# Patient Record
Sex: FEMALE | Race: BLACK OR AFRICAN AMERICAN | NOT HISPANIC OR LATINO | Employment: UNEMPLOYED | URBAN - METROPOLITAN AREA
[De-identification: names, ages, dates, MRNs, and addresses within clinical notes are randomized per-mention and may not be internally consistent; named-entity substitution may affect disease eponyms.]

---

## 2023-03-24 PROBLEM — K52.9 INFLAMMATORY BOWEL DISEASE: Status: ACTIVE | Noted: 2023-03-24

## 2023-03-24 PROBLEM — K80.00 CALCULUS OF GALLBLADDER WITH ACUTE CHOLECYSTITIS WITHOUT OBSTRUCTION: Status: ACTIVE | Noted: 2023-03-24

## 2023-03-24 PROBLEM — N92.6 MISSED PERIOD: Status: ACTIVE | Noted: 2023-03-24

## 2023-03-24 PROBLEM — R73.03 PREDIABETES: Status: ACTIVE | Noted: 2023-03-24

## 2023-03-24 PROBLEM — N89.8 VAGINAL DISCHARGE: Status: ACTIVE | Noted: 2023-03-24

## 2023-03-24 PROBLEM — R14.3 FLATUS: Status: ACTIVE | Noted: 2023-03-24

## 2023-03-24 PROBLEM — L70.9 ACNE: Status: ACTIVE | Noted: 2023-03-24

## 2023-03-24 PROBLEM — B37.31 VAGINAL YEAST INFECTION: Status: ACTIVE | Noted: 2023-03-24

## 2023-03-24 PROBLEM — K21.9 GERD (GASTROESOPHAGEAL REFLUX DISEASE): Status: ACTIVE | Noted: 2023-03-24

## 2023-03-24 PROBLEM — R19.7 DIARRHEA: Status: ACTIVE | Noted: 2023-03-24

## 2023-03-24 RX ORDER — DIAPER,BRIEF,INFANT-TODD,DISP
EACH MISCELLANEOUS 2 TIMES DAILY PRN
COMMUNITY
End: 2024-01-02 | Stop reason: WASHOUT

## 2023-03-24 RX ORDER — DICYCLOMINE HYDROCHLORIDE 10 MG/1
10 CAPSULE ORAL 3 TIMES DAILY PRN
COMMUNITY
Start: 2021-12-02 | End: 2024-04-09 | Stop reason: WASHOUT

## 2023-03-24 RX ORDER — METFORMIN HYDROCHLORIDE 500 MG/1
500 TABLET ORAL 2 TIMES DAILY
COMMUNITY
End: 2024-04-09 | Stop reason: WASHOUT

## 2023-03-24 RX ORDER — NAPROXEN 500 MG/1
1 TABLET ORAL AS NEEDED
COMMUNITY
Start: 2021-04-12

## 2023-03-24 RX ORDER — MESALAMINE 1.2 G/1
4 TABLET, DELAYED RELEASE ORAL
COMMUNITY
Start: 2021-12-02 | End: 2023-12-05 | Stop reason: ALTCHOICE

## 2023-03-24 RX ORDER — PAROXETINE HYDROCHLORIDE 40 MG/1
1 TABLET, FILM COATED ORAL DAILY
COMMUNITY
Start: 2021-12-02 | End: 2023-10-09 | Stop reason: WASHOUT

## 2023-03-24 RX ORDER — OMEPRAZOLE 20 MG/1
1 CAPSULE, DELAYED RELEASE ORAL DAILY
COMMUNITY
Start: 2021-12-14 | End: 2024-01-02 | Stop reason: ALTCHOICE

## 2023-03-24 RX ORDER — QUETIAPINE FUMARATE 50 MG/1
1 TABLET, FILM COATED ORAL NIGHTLY
COMMUNITY
Start: 2021-12-02 | End: 2023-11-10 | Stop reason: SDUPTHER

## 2023-03-24 RX ORDER — CYCLOBENZAPRINE HCL 10 MG
1 TABLET ORAL 2 TIMES DAILY
COMMUNITY
Start: 2021-04-12 | End: 2023-10-09 | Stop reason: SDUPTHER

## 2023-03-24 RX ORDER — SIMETHICONE 80 MG
TABLET,CHEWABLE ORAL
COMMUNITY
Start: 2021-12-14 | End: 2024-01-02 | Stop reason: SDUPTHER

## 2023-08-11 ENCOUNTER — OFFICE VISIT (OUTPATIENT)
Dept: PRIMARY CARE | Facility: CLINIC | Age: 48
End: 2023-08-11
Payer: COMMERCIAL

## 2023-08-11 VITALS
RESPIRATION RATE: 18 BRPM | TEMPERATURE: 97.5 F | OXYGEN SATURATION: 99 % | DIASTOLIC BLOOD PRESSURE: 82 MMHG | HEART RATE: 88 BPM | BODY MASS INDEX: 35.57 KG/M2 | HEIGHT: 62 IN | SYSTOLIC BLOOD PRESSURE: 115 MMHG | WEIGHT: 193.3 LBS

## 2023-08-11 DIAGNOSIS — E66.9 OBESITY (BMI 30-39.9): Primary | ICD-10-CM

## 2023-08-11 PROCEDURE — 99213 OFFICE O/P EST LOW 20 MIN: CPT | Performed by: STUDENT IN AN ORGANIZED HEALTH CARE EDUCATION/TRAINING PROGRAM

## 2023-08-11 ASSESSMENT — PAIN SCALES - GENERAL: PAINLEVEL: 8

## 2023-08-11 NOTE — PROGRESS NOTES
"Subjective   Patient ID: Marquita To is a 48 y.o. female who presents for Establish Care (Nutrition), Back Pain, and Ankle Pain.    HPI   48 years old female with major complaint today of inability to lose weight.  Patient stated that even though she follows strict diet and exercise daily she cannot lose any weight which is frustrating to her.  Patient is adding that additional weight is leading to back pain and ankle pain with activity and exercise which making losing weight even more harder.  Concern of prediabetes and she was started on 500 mg of metformin however declined any plan to increase metformin dosage as it leads to GI discomfort.  Patient would like to be received to bariatric surgery.    Review of Systems  Negative unless stated in HPI  Objective   Blood Pressure 115/82 (BP Location: Right arm, Patient Position: Sitting, BP Cuff Size: Adult)   Pulse 88   Temperature 36.4 °C (97.5 °F) (Temporal)   Respiration 18   Height 1.575 m (5' 2\")   Weight 87.7 kg (193 lb 4.8 oz)   Oxygen Saturation 99%   Body Mass Index 35.36 kg/m²     Physical Exam  Constitutional: Well developed, awake, alert, oriented x3  Head and Face: NCAT  Eyes: Normal external exam, EOMI  ENT: Normal external inspection of ears and nose. Oropharynx normal.  Cardiovascular: RRR, S1/S2, no murmurs, rubs, or gallops, radial pulses +2, no edema of extremities  Pulmonary: CTAB, no respiratory distress.  Abdomen: +BS, soft, non-tender, nondistended, no guarding or rebound, no masses noted  Neuro: A&O x3, CN II-XII grossly intact  MSK: No joint swelling, normal movements of all extremities. Range of motion- normal.  Skin- No lesions, contusions, or erythema.  Psychiatric: Judgment intact. Appropriate mood and behavior    Assessment/Plan   48 years old female who is presenting to the clinic to get assistance in weight loss.  By chart review her weight have been almost the same over the past 18 months at least occasional 5 for 10 pounds " Brow lift up and down.  We will check patient A1c and TSH to look for untreated thyroid problem or increase insulin resistance.  Obesity (BMI 30-39.9)  -     Hemoglobin A1C; Future  -     Tsh With Reflex To Free T4 If Abnormal; Future  -     Referral to [facility]; Future  -     Referral to Bariatric Surgery; Future     Return to clinic in 4 to 6 weeks or sooner as needed    Discussed with attending Dr. Chai Marin [facility]  Family medicine department PGY 3

## 2023-08-12 NOTE — PROGRESS NOTES
I reviewed with the resident the medical history and the resident’s findings on physical examination.  I discussed with the resident the patient’s diagnosis and concur with the treatment plan as documented in the resident note.     Chemo Paula MD

## 2023-10-03 ENCOUNTER — LAB (OUTPATIENT)
Dept: LAB | Facility: LAB | Age: 48
End: 2023-10-03
Payer: COMMERCIAL

## 2023-10-03 DIAGNOSIS — N92.6 MISSED PERIOD: ICD-10-CM

## 2023-10-03 DIAGNOSIS — E66.9 OBESITY (BMI 30-39.9): ICD-10-CM

## 2023-10-03 LAB
EST. AVERAGE GLUCOSE BLD GHB EST-MCNC: 120 MG/DL
HBA1C MFR BLD: 5.8 %
TSH SERPL-ACNC: 1.95 MIU/L (ref 0.44–3.98)

## 2023-10-03 PROCEDURE — 36415 COLL VENOUS BLD VENIPUNCTURE: CPT

## 2023-10-04 ENCOUNTER — TELEPHONE (OUTPATIENT)
Dept: PRIMARY CARE | Facility: CLINIC | Age: 48
End: 2023-10-04

## 2023-10-04 ENCOUNTER — APPOINTMENT (OUTPATIENT)
Dept: PRIMARY CARE | Facility: CLINIC | Age: 48
End: 2023-10-04
Payer: COMMERCIAL

## 2023-10-04 NOTE — TELEPHONE ENCOUNTER
Copied from CRM #[id number]. Topic: Complaint - Call Not Returned by  Office Timely  >> Oct 4, 2023 12:48 PM Bebe VIZCAINO wrote:  [id number], Marquita To,    Details of complaint: Patient has tried numerous times to reach Doctors office to speak with skyler Aguilar or a member of his team about lab results everytime see has called they hung up on her

## 2023-10-05 DIAGNOSIS — N92.6 MISSED PERIOD: Primary | ICD-10-CM

## 2023-10-05 LAB
T3FREE SERPL-MCNC: 2.4 PG/ML (ref 2.3–4.2)
T4 FREE SERPL-MCNC: 0.99 NG/DL (ref 0.78–1.48)

## 2023-10-09 ENCOUNTER — OFFICE VISIT (OUTPATIENT)
Dept: PRIMARY CARE | Facility: CLINIC | Age: 48
End: 2023-10-09
Payer: COMMERCIAL

## 2023-10-09 VITALS
WEIGHT: 191.8 LBS | RESPIRATION RATE: 18 BRPM | HEIGHT: 62 IN | DIASTOLIC BLOOD PRESSURE: 80 MMHG | BODY MASS INDEX: 35.3 KG/M2 | OXYGEN SATURATION: 95 % | SYSTOLIC BLOOD PRESSURE: 114 MMHG | TEMPERATURE: 95.4 F | HEART RATE: 82 BPM

## 2023-10-09 DIAGNOSIS — M54.2 NECK PAIN: ICD-10-CM

## 2023-10-09 DIAGNOSIS — F33.1 MODERATE EPISODE OF RECURRENT MAJOR DEPRESSIVE DISORDER (MULTI): Primary | ICD-10-CM

## 2023-10-09 PROCEDURE — 99214 OFFICE O/P EST MOD 30 MIN: CPT

## 2023-10-09 RX ORDER — PAROXETINE 10 MG/1
10 TABLET, FILM COATED ORAL EVERY MORNING
Qty: 420 TABLET | Refills: 0 | Status: SHIPPED | OUTPATIENT
Start: 2023-10-09 | End: 2023-10-10 | Stop reason: SDUPTHER

## 2023-10-09 RX ORDER — BUPROPION HYDROCHLORIDE 150 MG/1
150 TABLET ORAL EVERY MORNING
Qty: 30 TABLET | Refills: 1 | Status: SHIPPED | OUTPATIENT
Start: 2023-10-09 | End: 2023-10-10 | Stop reason: SDUPTHER

## 2023-10-09 RX ORDER — CYCLOBENZAPRINE HCL 10 MG
10 TABLET ORAL 2 TIMES DAILY
Qty: 28 TABLET | Refills: 0 | Status: SHIPPED | OUTPATIENT
Start: 2023-10-09 | End: 2023-10-10 | Stop reason: SDUPTHER

## 2023-10-09 ASSESSMENT — ENCOUNTER SYMPTOMS
WHEEZING: 0
ABDOMINAL DISTENTION: 0
NUMBNESS: 0
EYE ITCHING: 0
EYE DISCHARGE: 0
DIARRHEA: 0
DIFFICULTY URINATING: 0
CHEST TIGHTNESS: 0
NECK PAIN: 1
WEAKNESS: 0
FACIAL SWELLING: 1
FREQUENCY: 0
VOICE CHANGE: 1
FATIGUE: 1
NAUSEA: 0
ACTIVITY CHANGE: 1
DYSURIA: 0
CONSTIPATION: 0
CHILLS: 0
DIZZINESS: 0
SHORTNESS OF BREATH: 0
FEVER: 0
APPETITE CHANGE: 0
RHINORRHEA: 1

## 2023-10-09 ASSESSMENT — PAIN SCALES - GENERAL: PAINLEVEL: 8

## 2023-10-09 NOTE — PATIENT INSTRUCTIONS
It was so great to see you today Marquita To  . Today we discussed:    - Start Paxil SSRI taper 30mg 1 week--> 20mg for 1 week-->and 10mg for 1 week and then stop  -Start burproprion 150mg daily  -Ordered an ultrasound of thyroid   -Continue with [facility]  -practice yoga and sleep hygiene   -Follow on 1 months    Call [phone]  For Care if you need to schedule appointment with specialist or images.    Follow up in       You were see by:    Dr. Jaquelin Lujan D.O.  [facility]   Phone: [phone]

## 2023-10-09 NOTE — PROGRESS NOTES
"Subjective   Patient ID: Marquita To is a 48 y.o. female who presents for Establish Care, Thyroid Problem (Thyroid concerns), and Diabetes (Concerns about diabetes).       Patient starts with multiple concerns, although very focused on the idea that she believes she has something wrong with her thyroid. Overall, she does \"not feel good. \" She starts by discussing she has been ,Antidepressant since her mother passed in 2019. She c/o:  -weight gain, puffy face  -increased fatigue  -poor sleep, low concentration, & low motivation  -voice changes  -She is on paroxetine and quetiapine    Additionally she c/o:  -irregular periods, hair loss, hot flashs        Review of Systems   Constitutional:  Positive for activity change and fatigue. Negative for appetite change, chills and fever.   HENT:  Positive for facial swelling, rhinorrhea and voice change. Negative for hearing loss.    Eyes:  Negative for discharge, itching and visual disturbance.   Respiratory:  Negative for chest tightness, shortness of breath and wheezing.    Cardiovascular:  Negative for chest pain and leg swelling.   Gastrointestinal:  Negative for abdominal distention, constipation, diarrhea and nausea.   Endocrine: Positive for heat intolerance. Negative for cold intolerance.   Genitourinary:  Negative for difficulty urinating, dysuria and frequency.   Musculoskeletal:  Positive for neck pain.   Skin:  Negative for pallor and rash.   Neurological:  Negative for dizziness, weakness and numbness.       Objective   /80 (BP Location: Right arm, Patient Position: Sitting, BP Cuff Size: Adult)   Pulse 82   Temp 35.2 °C (95.4 °F) (Temporal)   Resp 18   Ht 1.575 m (5' 2\")   Wt 87 kg (191 lb 12.8 oz)   SpO2 95%   BMI 35.08 kg/m²     Physical Exam  Constitutional:       General: She is in acute distress (tearful during interview).      Appearance: She is obese.   HENT:      Head: Normocephalic and atraumatic.      Comments: Thinning hair     " "Nose: Nose normal.      Mouth/Throat:      Mouth: Mucous membranes are moist.   Eyes:      Extraocular Movements: Extraocular movements intact.      Conjunctiva/sclera: Conjunctivae normal.   Neck:      Comments: Enlarged thyroid  Cardiovascular:      Rate and Rhythm: Normal rate and regular rhythm.      Heart sounds: No murmur heard.  Pulmonary:      Effort: Pulmonary effort is normal. No respiratory distress.      Breath sounds: Normal breath sounds. No wheezing.   Abdominal:      General: There is no distension.      Palpations: Abdomen is soft. There is no mass.      Tenderness: There is no abdominal tenderness.   Musculoskeletal:      Cervical back: Normal range of motion.   Skin:     General: Skin is warm and dry.      Capillary Refill: Capillary refill takes 2 to 3 seconds.   Neurological:      General: No focal deficit present.      Mental Status: She is alert and oriented to person, place, and time.   Psychiatric:      Comments: \"Depressed\" mood congruent with affect         Assessment/Plan   48 years old female who is presenting to the clinic with multiple concerns. Patient focused on thyroid issues, but recent labs wnl; TSH 1.95, T3 0.99, T4 0.99, with the exception of elevated Hgb A1c 5.8%. Patient met criteria for MDD and agreed with assessment. She reports she finally has been scheduled with [facility] for counseling. Discussed the side effects of SSRI concerning weight gain. Plan to taper from SSRI and start bupropion. On exam patient did have enlarged thyroid with complaint of voice changes, therefore ordered Thyroid US. Talked at length the importance of exercise and nutritious diet. She reports muscle tightness in next make it difficult to exercise. Plan below   Diagnoses and all orders for this visit:  Moderate episode of recurrent major depressive disorder (CMS/HCC)  -     buPROPion XL (Wellbutrin XL) 150 mg 24 hr tablet; Take 1 tablet (150 mg) by mouth once daily in the morning. Do not " crush, chew, or split.  -     US thyroid; Future  -     START PARoxetine (Paxil) 10 mg tablet taper; decrease to 30mg daily for 1 week--> then 20mg for 1 week-->and 10mg for 1 week and then stop  -     Referral to [facility]; Future for Massages  Neck pain  -     cyclobenzaprine (Flexeril) 10 mg tablet; Take 1 tablet (10 mg) by mouth 2 times a day for 14 days. Temporary use only  - Start daily yoga for muscle relief      Discussed and evaluated with Dr. Cirilo Lujan,

## 2023-10-10 ENCOUNTER — TELEPHONE (OUTPATIENT)
Dept: PRIMARY CARE | Facility: CLINIC | Age: 48
End: 2023-10-10

## 2023-10-10 DIAGNOSIS — M54.2 NECK PAIN: ICD-10-CM

## 2023-10-10 DIAGNOSIS — F33.1 MODERATE EPISODE OF RECURRENT MAJOR DEPRESSIVE DISORDER (MULTI): ICD-10-CM

## 2023-10-10 RX ORDER — PAROXETINE 10 MG/1
10 TABLET, FILM COATED ORAL EVERY MORNING
Qty: 420 TABLET | Refills: 0 | Status: SHIPPED | OUTPATIENT
Start: 2023-10-10 | End: 2023-10-11

## 2023-10-10 RX ORDER — CYCLOBENZAPRINE HCL 10 MG
10 TABLET ORAL 2 TIMES DAILY
Qty: 28 TABLET | Refills: 0 | Status: SHIPPED | OUTPATIENT
Start: 2023-10-10 | End: 2024-04-09 | Stop reason: SDUPTHER

## 2023-10-10 RX ORDER — BUPROPION HYDROCHLORIDE 150 MG/1
150 TABLET ORAL EVERY MORNING
Qty: 30 TABLET | Refills: 1 | Status: SHIPPED | OUTPATIENT
Start: 2023-10-10 | End: 2023-12-05 | Stop reason: ALTCHOICE

## 2023-10-10 NOTE — TELEPHONE ENCOUNTER
"Pharmacy is asking for clarification on a prescription for the PARoxetine (Paxil) 10 mg tablet.  Says the PT was on this medication with 40 mg tablet and now it's switching to 10 mg tablets and it says \"By mouth once daily for 420 doses\".  Asks if you want the 40 mg discontinued.    "

## 2023-10-11 DIAGNOSIS — F33.1 MODERATE EPISODE OF RECURRENT MAJOR DEPRESSIVE DISORDER (MULTI): ICD-10-CM

## 2023-10-11 RX ORDER — PAROXETINE 10 MG/1
TABLET, FILM COATED ORAL
Qty: 42 TABLET | Refills: 0 | Status: SHIPPED | OUTPATIENT
Start: 2023-10-16 | End: 2023-11-10 | Stop reason: SDUPTHER

## 2023-10-11 NOTE — PROGRESS NOTES
I saw and evaluated the patient. I personally obtained the key and critical portions of the history and physical exam or was physically present for key and critical portions performed by the resident. I reviewed the resident's documentation and discussed the patient with the resident. I agree with the resident's medical decision making as documented in the note.    Joan Kerr MD

## 2023-10-12 ENCOUNTER — HOSPITAL ENCOUNTER (OUTPATIENT)
Dept: RADIOLOGY | Facility: HOSPITAL | Age: 48
Discharge: HOME | End: 2023-10-12
Payer: COMMERCIAL

## 2023-10-12 DIAGNOSIS — F33.1 MODERATE EPISODE OF RECURRENT MAJOR DEPRESSIVE DISORDER (MULTI): ICD-10-CM

## 2023-10-12 PROCEDURE — 76536 US EXAM OF HEAD AND NECK: CPT | Performed by: RADIOLOGY

## 2023-10-12 PROCEDURE — 76536 US EXAM OF HEAD AND NECK: CPT

## 2023-10-16 DIAGNOSIS — L65.9 NON-SCARRING HAIR LOSS: ICD-10-CM

## 2023-10-16 DIAGNOSIS — E04.1 THYROID NODULE: Primary | ICD-10-CM

## 2023-10-19 NOTE — PROGRESS NOTES
Received a page on the Inpatient FM service from patient who states she is having thyroid pain and swelling. She was recently seen in [facility] clinic and was found to have an enlarged thyroid and multiple nodules with plan for an FNA. Patient states earlier today she started having pain and swelling her neck. She denies any difficulty breathing or concerns with her airway. Discussed with patient if she develops a rapid increase in swelling, difficulty breathing, or systemic symptoms like fevers or chills she should report to her nearest Urgent care/ED. Recommended NSAIDs for pain control and swelling at this time only. Patient in agreement with plan.    Charlene Hernandez MD  Family Medicine, PGY-3

## 2023-10-20 ENCOUNTER — OFFICE VISIT (OUTPATIENT)
Dept: DERMATOLOGY | Facility: CLINIC | Age: 48
End: 2023-10-20
Payer: COMMERCIAL

## 2023-10-20 DIAGNOSIS — L85.3 XEROSIS CUTIS: ICD-10-CM

## 2023-10-20 DIAGNOSIS — L70.0 ACNE VULGARIS: Primary | ICD-10-CM

## 2023-10-20 DIAGNOSIS — L66.9 CICATRICIAL ALOPECIA: ICD-10-CM

## 2023-10-20 PROCEDURE — 99214 OFFICE O/P EST MOD 30 MIN: CPT | Performed by: DERMATOLOGY

## 2023-10-20 RX ORDER — TRETINOIN 0.5 MG/G
CREAM TOPICAL
Qty: 20 G | Refills: 2 | Status: SHIPPED | OUTPATIENT
Start: 2023-10-20 | End: 2024-01-26 | Stop reason: SDUPTHER

## 2023-10-20 RX ORDER — GLY/DIMETH/PETROLAT,WHT/WATER
CREAM (GRAM) TOPICAL
Qty: 453 G | Refills: 11 | Status: SHIPPED | OUTPATIENT
Start: 2023-10-20

## 2023-10-20 RX ORDER — DOXYCYCLINE 100 MG/1
CAPSULE ORAL
Qty: 60 CAPSULE | Refills: 3 | Status: SHIPPED | OUTPATIENT
Start: 2023-10-20 | End: 2024-01-26 | Stop reason: WASHOUT

## 2023-10-20 RX ORDER — CLOBETASOL PROPIONATE 0.46 MG/ML
SOLUTION TOPICAL
Qty: 50 ML | Refills: 1 | Status: SHIPPED | OUTPATIENT
Start: 2023-10-20 | End: 2024-01-29

## 2023-10-20 RX ORDER — FLUCONAZOLE 150 MG/1
TABLET ORAL
Qty: 1 TABLET | Refills: 2 | Status: SHIPPED | OUTPATIENT
Start: 2023-10-20 | End: 2023-11-14 | Stop reason: WASHOUT

## 2023-10-20 RX ORDER — BENZOYL PEROXIDE 50 MG/ML
LIQUID TOPICAL
Qty: 227 G | Refills: 3 | Status: SHIPPED | OUTPATIENT
Start: 2023-10-20 | End: 2024-01-26 | Stop reason: SDUPTHER

## 2023-10-20 RX ORDER — CLINDAMYCIN PHOSPHATE 10 MG/G
GEL TOPICAL
Qty: 60 G | Refills: 3 | Status: SHIPPED | OUTPATIENT
Start: 2023-10-20 | End: 2024-01-26 | Stop reason: SDUPTHER

## 2023-10-20 ASSESSMENT — DERMATOLOGY QUALITY OF LIFE (QOL) ASSESSMENT
ARE THERE EXCLUSIONS OR EXCEPTIONS FOR THE QUALITY OF LIFE ASSESSMENT: NO
RATE HOW BOTHERED YOU ARE BY EFFECTS OF YOUR SKIN PROBLEMS ON YOUR ACTIVITIES (EG, GOING OUT, ACCOMPLISHING WHAT YOU WANT, WORK ACTIVITIES OR YOUR RELATIONSHIPS WITH OTHERS): 0 - NEVER BOTHERED
RATE HOW EMOTIONALLY BOTHERED YOU ARE BY YOUR SKIN PROBLEM (FOR EXAMPLE, WORRY, EMBARRASSMENT, FRUSTRATION): 2
WHAT SINGLE SKIN CONDITION LISTED BELOW IS THE PATIENT ANSWERING THE QUALITY-OF-LIFE ASSESSMENT QUESTIONS ABOUT: ACNE
RATE HOW BOTHERED YOU ARE BY SYMPTOMS OF YOUR SKIN PROBLEM (EG, ITCHING, STINGING BURNING, HURTING OR SKIN IRRITATION): 2

## 2023-10-20 ASSESSMENT — DERMATOLOGY PATIENT ASSESSMENT
DO YOU USE A TANNING BED: NO
ARE YOU AN ORGAN TRANSPLANT RECIPIENT: NO
DO YOU HAVE ANY NEW OR CHANGING LESIONS: NO
FOR PATIENTS COMING IN FOR A FOLLOW-UP VISIT - HAVE THERE BEEN ANY CHANGES IN YOUR HEALTH SINCE YOUR LAST VISIT: THYROID NODULES
ARE YOU TRYING TO GET PREGNANT: NO

## 2023-10-20 ASSESSMENT — ITCH NUMERIC RATING SCALE: HOW SEVERE IS YOUR ITCHING?: 0

## 2023-10-20 NOTE — PROGRESS NOTES
Subjective     Marquita To is a 48 y.o. female who presents for the following: Acne (Pt here today for Acne, located on face and body. Pt saw MB 12/2022 and was prescribed Doxycycline 100mg every day-helped but caused yeast infections. Tretinoin 0.05% cream, Benzamycin 3%-5%(pt did not use). Acne is getting worse. Using a SA wash.) and Dry skin (Pt here for generalized dry skin. Using Dove soap. Moisturizing with Eucerin and Cetaphil. Pt does have Thyroid disorder not sure if that makes dry skin worse.).   She also has hair loss and would like to discuss getting an approval for a prosthetic hair piece. She read on [employer] that she could get a prosthesis approved through her insurance.  She currently is applying clotrimazole every  day to her face.    Review of Systems:  No other skin or systemic complaints other than what is documented elsewhere in the note.    The following portions of the chart were reviewed this encounter and updated as appropriate:          Skin Cancer History  No skin cancer on file.      Specialty Problems          Dermatology Problems    Acne        Objective   Well appearing patient in no apparent distress; mood and affect are within normal limits.    A focused skin examination was performed of the face, chest, neck, back, scalp. All findings within normal limits unless otherwise noted below.    Assessment/Plan   1. Acne vulgaris  Chest - Medial (Center), Head - Anterior (Face), Mid Back  Scattered comedones and inflammatory papulopustules. On the back more predominantly than the face and chest.  There are several ice pick scars on the cheeks    The chronic and intermittently flaring nature of acne was reviewed with the patient today. Patient advised that acne is multifactorial. Treatment options were reviewed with the patient. Advised that typically takes 2-3 months to see 60-80% improvement and treatments may worsen acne appearance prior to improvement.     Wash face twice daily  Do  not spot treat, treat the entire surface area to treat current breakouts and prevent new breakouts    Treatment recommendations:  -benzoyl peroxide 5% cleanser for the face, chest, back  -start clindamycin 1% topical gel for the face, chest, back  -Start tretinoin 0.025% cream pea sized amount every other day, increase to daily as tolerated  - doxycycline 100mg 2x daily x  3 months  - Diflucan 150mg x 1 dose as needed for yeast infection    Patient advised benzoyl peroxide may bleach clothing or towels and can be drying and irritating to the skin.  Side effects of topical retinoids reviewed including increased photosensitivity, dryness, irritation and redness. To help alleviate side effects patient advised to apply initially every 2-3 nights and increase to daily as tolerated or apply topical non-comedogenic moisturizer prior to application.    Side effects of oral doxycycline were reviewed including upset stomach, indigestion, heartburn, rash, increased photosensitivity. Patient advised to take medication with non-dairy food and water, not lie flat for 30-45 minutes after taking medication and to practice sun protective behaviors. Patient to call should they experience side effect or concern with medication. Patient aware to avoid pregnancy while on medication.    Diflucan x 1 dose at onset of yeast infection symptoms.    benzoyl peroxide 5 % external wash - Chest - Medial (Center), Head - Anterior (Face), Mid Back  Lather on face,chest, back daily in shower    Related Procedures  Follow Up In Dermatology - Established Patient    Related Medications  tretinoin (Retin-A) 0.05 % cream  Apply a pea sized amount every other day increase to daily as tolerated    clindamycin (Cleocin T) 1 % gel  Apply to face daily in morning    doxycycline (Vibramycin) 100 mg capsule  Take by mouth 2x daily with food and water Take with at least 8 ounces (large glass) of water, do not lie down for 30 minutes after    fluconazole  (Diflucan) 150 mg tablet  Take 1 tablet once at onset of symptoms    2. Cicatricial alopecia  Mid Parietal Scalp  Loss of hair follicle openings with     Central Centrifugal Cicatricial Alopecia (CCCA) is a form of scarring alopecia on the scalp that results in permanent hair loss. It is the most common form of scarring hair loss seen in black women. However, it may be seen in men and among persons of all races and hair colour (though rarely). Middle-aged women are most commonly affected.    The exact cause of CCCA is unknown and is likely multifactorial. A genetic component has been suggested.    Treatment is to preserve residual hair and prevent progression.    Start topica clobetasol 0,05% scalp solution 2x daily 2 weeks on 2 weeks off. Side effects of toical steroids reviewed including skin atrophy.    I will research [employer] as stated on their website to see if we could obtain a cranial prosthesis.    clobetasol (Temovate) 0.05 % external solution - Mid Parietal Scalp  Apply to scalp 2x daily x 2wks on 2 wks off    3. Xerosis cutis  Fine, ashy, pale to white scale diffusely over skin.    Dry skin is common, often worse during the dry months of the year and may also worsen as we age.  A gentle skin care regimen includes:  -washing with a mild soap without fragrance such as Dove for sensitive skin, Cetaphil or Cerave.  -pat dry after washing and then apply a thick moisturizer such as Cerave cream or Cetaphil cream. Creams are thicker than lotions and often do a better job of restoring the skin barrier.      Cetaphil (Cetaphil Moisturizing) cream  Apply to body daily after bathing and as needed for dry skin      Follow up in 3 months.

## 2023-10-24 ENCOUNTER — TELEPHONE (OUTPATIENT)
Dept: DERMATOLOGY | Facility: CLINIC | Age: 48
End: 2023-10-24
Payer: COMMERCIAL

## 2023-10-24 NOTE — TELEPHONE ENCOUNTER
Pt called r/t PA for BPO wash.     PA was denied on [facility]. Awaiting fax for further details.   **Denied today. Request Reference Number: [id number]. BENZOYL PER LIQ 5% WASH is denied for not meeting the prior authorization requirement(s). ** copied from [facility]

## 2023-10-25 ENCOUNTER — TELEPHONE (OUTPATIENT)
Dept: DERMATOLOGY | Facility: CLINIC | Age: 48
End: 2023-10-25
Payer: COMMERCIAL

## 2023-10-25 NOTE — TELEPHONE ENCOUNTER
Called patient to discuss her question about cranial prosthesis. I advised I can email her a prescription, she will still have to purchase it out of pocket then get an invoice sent to her and then submit all to her insurance company for possible reimbursement.  She is aware to use the website she had referenced with me, which is [employer]    She asked for me to email the prescription to her email address.

## 2023-10-26 ENCOUNTER — HOSPITAL ENCOUNTER (OUTPATIENT)
Dept: RADIOLOGY | Facility: HOSPITAL | Age: 48
Discharge: HOME | End: 2023-10-26
Payer: COMMERCIAL

## 2023-10-26 VITALS
TEMPERATURE: 98.1 F | DIASTOLIC BLOOD PRESSURE: 68 MMHG | OXYGEN SATURATION: 100 % | SYSTOLIC BLOOD PRESSURE: 115 MMHG | HEART RATE: 70 BPM | RESPIRATION RATE: 18 BRPM

## 2023-10-26 DIAGNOSIS — E04.1 THYROID NODULE: ICD-10-CM

## 2023-10-26 PROCEDURE — 99152 MOD SED SAME PHYS/QHP 5/>YRS: CPT

## 2023-10-26 PROCEDURE — 2500000004 HC RX 250 GENERAL PHARMACY W/ HCPCS (ALT 636 FOR OP/ED): Performed by: RADIOLOGY

## 2023-10-26 PROCEDURE — 88173 CYTOPATH EVAL FNA REPORT: CPT | Performed by: PATHOLOGY

## 2023-10-26 PROCEDURE — 88112 CYTOPATH CELL ENHANCE TECH: CPT | Mod: TC,59,MCY | Performed by: STUDENT IN AN ORGANIZED HEALTH CARE EDUCATION/TRAINING PROGRAM

## 2023-10-26 PROCEDURE — 60100 BIOPSY OF THYROID: CPT

## 2023-10-26 PROCEDURE — 10005 FNA BX W/US GDN 1ST LES: CPT | Performed by: RADIOLOGY

## 2023-10-26 PROCEDURE — 99152 MOD SED SAME PHYS/QHP 5/>YRS: CPT | Performed by: RADIOLOGY

## 2023-10-26 RX ORDER — MIDAZOLAM HYDROCHLORIDE 1 MG/ML
INJECTION INTRAMUSCULAR; INTRAVENOUS AS NEEDED
Status: COMPLETED | OUTPATIENT
Start: 2023-10-26 | End: 2023-10-26

## 2023-10-26 RX ORDER — FENTANYL CITRATE 50 UG/ML
INJECTION, SOLUTION INTRAMUSCULAR; INTRAVENOUS AS NEEDED
Status: COMPLETED | OUTPATIENT
Start: 2023-10-26 | End: 2023-10-26

## 2023-10-26 RX ADMIN — FENTANYL CITRATE 50 MCG: 50 INJECTION, SOLUTION INTRAMUSCULAR; INTRAVENOUS at 14:34

## 2023-10-26 RX ADMIN — MIDAZOLAM HYDROCHLORIDE 1 MG: 1 INJECTION, SOLUTION INTRAMUSCULAR; INTRAVENOUS at 14:35

## 2023-10-26 RX ADMIN — FENTANYL CITRATE 50 MCG: 50 INJECTION, SOLUTION INTRAMUSCULAR; INTRAVENOUS at 14:41

## 2023-10-26 ASSESSMENT — PAIN SCALES - GENERAL
PAINLEVEL_OUTOF10: 0 - NO PAIN
PAINLEVEL_OUTOF10: 0 - NO PAIN
PAINLEVEL_OUTOF10: 2
PAINLEVEL_OUTOF10: 0 - NO PAIN

## 2023-10-26 ASSESSMENT — PAIN - FUNCTIONAL ASSESSMENT
PAIN_FUNCTIONAL_ASSESSMENT: 0-10

## 2023-10-26 NOTE — POST-PROCEDURE NOTE
Interventional Radiology Brief Postprocedure Note    Attending: Dr. fournier    Assistant: Dr. Baptiste    Diagnosis: Thyroid nodule (left TIRADS 4)    Description of procedure: Using ultrasound guidance a total of 4 passes were made with 25 gauge spinal needle to the left thyroid nodule. Scanning after each pass demonstrated no bleeding. Patient tolerated the procedure. Please refer to the full radiology report for further details.       Anesthesia:  MAC    Complications: None    Estimated Blood Loss: minimal    Medications (Filter: Administrations occurring from 1428 to 1445 on 10/26/23) As of 10/26/23 1445      fentaNYL PF (Sublimaze) injection (mcg) Total dose:  100 mcg      Date/Time Rate/Dose/Volume Action       10/26/23  1434 50 mcg Given      1441 50 mcg Given               midazolam (Versed) injection (mg) Total dose:  1 mg      Date/Time Rate/Dose/Volume Action       10/26/23  1435 1 mg Given                   No specimens collected      See detailed result report with images in PACS.    The patient tolerated the procedure well without incident or complication and is in stable condition.

## 2023-10-26 NOTE — PRE-PROCEDURE NOTE
Interventional Radiology Preprocedure Note    Indication for procedure: The encounter diagnosis was Thyroid nodule.    Relevant review of systems: NA    Relevant Labs:   Lab Results   Component Value Date    CREATININE 0.83 09/21/2023    INR 1.0 09/21/2023    PROTIME 11.7 09/21/2023       Planned Sedation/Anesthesia: Moderate    Airway assessment: normal    Directed physical examination:    AO*3, soft neck, no evidence of infection    Mallampati: I (soft palate, uvula, fauces, and tonsillar pillars visible)    ASA Score: ASA 2 - Patient with mild systemic disease with no functional limitations    Benefits, risks and alternatives of procedure and planned sedation have been discussed with the patient and/or their representative. All questions answered and they agree to proceed.

## 2023-10-27 DIAGNOSIS — F33.1 MODERATE EPISODE OF RECURRENT MAJOR DEPRESSIVE DISORDER (MULTI): ICD-10-CM

## 2023-10-27 LAB
LABORATORY COMMENT REPORT: NORMAL
LABORATORY COMMENT REPORT: NORMAL
PATH REPORT.FINAL DX SPEC: NORMAL
PATH REPORT.GROSS SPEC: NORMAL
PATH REPORT.RELEVANT HX SPEC: NORMAL
PATH REPORT.TOTAL CANCER: NORMAL

## 2023-11-03 DIAGNOSIS — E04.1 THYROID NODULE: Primary | ICD-10-CM

## 2023-11-03 NOTE — PROGRESS NOTES
"Called patient on 11/3/2023 to discuss thyroid biopsy result. Initially thyroid US done and noted Multiple bilateral nodules including a 2.3 cm TI-RADS 4 nodule in the left thyroid lobe for which fine-needle aspiration is recommended based on size criteria. FNA done and resulted in follicular lesion of undetermined significance. Reviewed tyroid nodule with indeterminate cytology algorithm and since TSH test were within normal limits, it is suggested to repeat FNA in 6-12 weeks.     I discussed plan with patient who stated she did not want to wait 6 weeks and wants nodule to be surgically removed because she believes the thyroid nodule is the cause of symptoms such as \"puffy face\" and \"weight gain.\" This was discussed at last visit please refer to note. Patient request surgery referral to remove nodule and/or \"thyroid doctor\" to get involved in care. Told patient I understand her concerns and will discuss with attending physician. Plan to call patient back. In the mean time, I ordered a repeat thyroid bx 6 weeks from last procedure as indicated.   "

## 2023-11-10 NOTE — TELEPHONE ENCOUNTER
Rx Refill Request Telephone Encounter    Name:  Marquita To  :  [id number]  Medication Name:  Seroquel; Paxil            Specific Pharmacy location:  [facility]  Date of last appointment:  10/9/2023  Date of next appointment:  2023  Best number to reach patient:  [phone]

## 2023-11-10 NOTE — TELEPHONE ENCOUNTER
Pt called requesting refill of Seroquel. Noted pt scheduled 1 month FUV cancelled via provider. Noted Pt rescheduled for FUV with [facility]. Call placed to pt to inform r/s with incorrect clinic. No answer, voicemail left to return nurse call. Message sent to provider to update.

## 2023-11-13 ENCOUNTER — APPOINTMENT (OUTPATIENT)
Dept: PRIMARY CARE | Facility: CLINIC | Age: 48
End: 2023-11-13
Payer: COMMERCIAL

## 2023-11-14 ENCOUNTER — APPOINTMENT (OUTPATIENT)
Dept: ENDOCRINOLOGY | Facility: CLINIC | Age: 48
End: 2023-11-14
Payer: COMMERCIAL

## 2023-11-14 ENCOUNTER — TELEPHONE (OUTPATIENT)
Dept: ENDOCRINOLOGY | Facility: CLINIC | Age: 48
End: 2023-11-14

## 2023-11-14 RX ORDER — QUETIAPINE FUMARATE 50 MG/1
50 TABLET, FILM COATED ORAL NIGHTLY
Qty: 90 TABLET | Refills: 3 | Status: SHIPPED | OUTPATIENT
Start: 2023-11-14 | End: 2024-11-13

## 2023-11-14 RX ORDER — PAROXETINE HYDROCHLORIDE 40 MG/1
40 TABLET, FILM COATED ORAL EVERY MORNING
Qty: 240 TABLET | Refills: 3 | Status: SHIPPED | OUTPATIENT
Start: 2023-11-14

## 2023-11-14 NOTE — TELEPHONE ENCOUNTER
Marquita To   1975   [id number]   [phone]     Called patient and left voice message in regards to canceling 11/14 appt due to no showing at 11/10 appt. Per policy patient NS new patient appt and is dismissed from the practice. Patient was provided Central Scheduling number to schedule an appt with another Endo specialist.

## 2023-11-15 ENCOUNTER — TELEMEDICINE (OUTPATIENT)
Dept: PRIMARY CARE | Facility: CLINIC | Age: 48
End: 2023-11-15
Payer: COMMERCIAL

## 2023-11-15 ENCOUNTER — APPOINTMENT (OUTPATIENT)
Dept: PRIMARY CARE | Facility: HOSPITAL | Age: 48
End: 2023-11-15
Payer: COMMERCIAL

## 2023-11-15 DIAGNOSIS — E66.9 OBESITY (BMI 30-39.9): Primary | ICD-10-CM

## 2023-11-15 PROCEDURE — 99212 OFFICE O/P EST SF 10 MIN: CPT | Performed by: INTERNAL MEDICINE

## 2023-11-15 NOTE — PROGRESS NOTES
Subjective   Patient ID: Marquita To is a 48 y.o. female who presents for No chief complaint on file..    HPI patient is attended by video call because she is concerned about her persistent weight gain.  She is otherwise doing well and denies any abdominal pain, nausea vomiting, rashes, fever chills and chest pain.  She has history of acne vulgaris, morbid obesity, prediabetes, thyroid nodule, status post fine-needle aspiration, GERD, status post vaginal yeast infection and depression with anxiety    Review of Systems   Constitutional:  Positive for unexpected weight change.   HENT: Negative.     Eyes: Negative.    Respiratory: Negative.     Cardiovascular: Negative.    Gastrointestinal: Negative.    Endocrine: Negative.    Genitourinary: Negative.    Musculoskeletal: Negative.    Skin: Negative.    Allergic/Immunologic: Negative.    Neurological: Negative.    Hematological: Negative.    Psychiatric/Behavioral: Negative.         Objective   There were no vitals taken for this visit.    Physical Examnot done    Assessment/Plan    patient is advised to diet, exercise and do lifestyle modification regarding obesity.  She will continue current medication.  She is counseled and educated about having a good diet plan.  She will follow-up with her PCP for further management.

## 2023-11-16 ENCOUNTER — HOSPITAL ENCOUNTER (OUTPATIENT)
Dept: RADIOLOGY | Facility: HOSPITAL | Age: 48
Discharge: HOME | End: 2023-11-16
Payer: COMMERCIAL

## 2023-11-16 VITALS
RESPIRATION RATE: 2 BRPM | DIASTOLIC BLOOD PRESSURE: 74 MMHG | HEART RATE: 78 BPM | OXYGEN SATURATION: 97 % | SYSTOLIC BLOOD PRESSURE: 108 MMHG | TEMPERATURE: 97.5 F

## 2023-11-16 DIAGNOSIS — E04.1 THYROID NODULE: ICD-10-CM

## 2023-11-16 PROCEDURE — 2720000007 HC OR 272 NO HCPCS

## 2023-11-16 PROCEDURE — 88305 TISSUE EXAM BY PATHOLOGIST: CPT | Performed by: PATHOLOGY

## 2023-11-16 PROCEDURE — 88112 CYTOPATH CELL ENHANCE TECH: CPT | Mod: TC,59,MCY | Performed by: STUDENT IN AN ORGANIZED HEALTH CARE EDUCATION/TRAINING PROGRAM

## 2023-11-16 PROCEDURE — 10005 FNA BX W/US GDN 1ST LES: CPT

## 2023-11-16 PROCEDURE — 99152 MOD SED SAME PHYS/QHP 5/>YRS: CPT | Performed by: RADIOLOGY

## 2023-11-16 PROCEDURE — 76942 ECHO GUIDE FOR BIOPSY: CPT

## 2023-11-16 PROCEDURE — 60100 BIOPSY OF THYROID: CPT | Performed by: RADIOLOGY

## 2023-11-16 PROCEDURE — 88173 CYTOPATH EVAL FNA REPORT: CPT | Mod: TC,MCY | Performed by: STUDENT IN AN ORGANIZED HEALTH CARE EDUCATION/TRAINING PROGRAM

## 2023-11-16 PROCEDURE — 0753T DGTZ GLS MCRSCP SLD LEVEL IV: CPT | Mod: TC,SUR | Performed by: STUDENT IN AN ORGANIZED HEALTH CARE EDUCATION/TRAINING PROGRAM

## 2023-11-16 PROCEDURE — 88173 CYTOPATH EVAL FNA REPORT: CPT | Performed by: PATHOLOGY

## 2023-11-16 PROCEDURE — 2500000004 HC RX 250 GENERAL PHARMACY W/ HCPCS (ALT 636 FOR OP/ED): Performed by: RADIOLOGY

## 2023-11-16 PROCEDURE — 99152 MOD SED SAME PHYS/QHP 5/>YRS: CPT

## 2023-11-16 PROCEDURE — 76942 ECHO GUIDE FOR BIOPSY: CPT | Performed by: RADIOLOGY

## 2023-11-16 PROCEDURE — 60100 BIOPSY OF THYROID: CPT

## 2023-11-16 RX ORDER — FENTANYL CITRATE 50 UG/ML
INJECTION, SOLUTION INTRAMUSCULAR; INTRAVENOUS AS NEEDED
Status: COMPLETED | OUTPATIENT
Start: 2023-11-16 | End: 2023-11-16

## 2023-11-16 RX ORDER — MIDAZOLAM HYDROCHLORIDE 1 MG/ML
INJECTION INTRAMUSCULAR; INTRAVENOUS AS NEEDED
Status: COMPLETED | OUTPATIENT
Start: 2023-11-16 | End: 2023-11-16

## 2023-11-16 RX ADMIN — MIDAZOLAM HYDROCHLORIDE 1 MG: 1 INJECTION, SOLUTION INTRAMUSCULAR; INTRAVENOUS at 14:21

## 2023-11-16 RX ADMIN — FENTANYL CITRATE 50 MCG: 50 INJECTION, SOLUTION INTRAMUSCULAR; INTRAVENOUS at 14:21

## 2023-11-16 RX ADMIN — MIDAZOLAM HYDROCHLORIDE 1 MG: 1 INJECTION, SOLUTION INTRAMUSCULAR; INTRAVENOUS at 14:16

## 2023-11-16 RX ADMIN — FENTANYL CITRATE 50 MCG: 50 INJECTION, SOLUTION INTRAMUSCULAR; INTRAVENOUS at 14:16

## 2023-11-16 ASSESSMENT — PAIN SCALES - GENERAL
PAINLEVEL_OUTOF10: 0 - NO PAIN

## 2023-11-16 ASSESSMENT — PAIN - FUNCTIONAL ASSESSMENT
PAIN_FUNCTIONAL_ASSESSMENT: 0-10

## 2023-11-16 NOTE — DISCHARGE INSTRUCTIONS
You received moderate sedation:  - Do not drive a car, or operate any machinery or power tools of any kind.  - Do not drink any alcoholic drinks.  - Do not take any over the counter medications that may cause drowsiness.  - Do not make any important decisions or sign any legal documents.  - You need to have a responsible adult accompany you home.  - You may resume your normal diet.  - We strongly suggest that a responsible adult be with you for the rest of the day and also during the night. This is for your protection and safety.     For questions related to your procedure:  Please call [phone] between the hours of 7:00am-5:00pm Monday through Friday.  Please call [phone] after 5:00pm and on weekends and holidays.     In the event of an emergency call 911 or go to your nearest emergency room.

## 2023-11-16 NOTE — POST-PROCEDURE NOTE
Interventional Radiology Brief Postprocedure Note    Attending: Dr. Mcghee    Assistant: Dr. Baptiste    Diagnosis: Thyroid nodule    Description of procedure: Using ultrasound guidance a total of 4 passes were made with 25 gauge spinal needle to the left thyroid nodule. Subsequently, a total of 4 passes were made into the same nodule under ultrasound guidance using a 18 Gauge needle passed through a 17 gauge coaxial system. Scanning after each pass demonstrated no bleeding. Patient tolerated the procedure. Please refer to the full radiology report for further details.       Anesthesia:  MAC    Complications: None    Estimated Blood Loss: minimal    Medications (Filter: Administrations occurring from 1415 to 1432 on 11/16/23) As of 11/16/23 1432      fentaNYL PF (Sublimaze) injection (mcg) Total dose:  100 mcg      Date/Time Rate/Dose/Volume Action       11/16/23  1416 50 mcg Given      1421 50 mcg Given               midazolam (Versed) injection (mg) Total dose:  2 mg      Date/Time Rate/Dose/Volume Action       11/16/23  1416 1 mg Given      1421 1 mg Given                   No specimens collected      See detailed result report with images in PACS.    The patient tolerated the procedure well without incident or complication and is in stable condition.

## 2023-11-16 NOTE — PRE-PROCEDURE NOTE
Interventional Radiology Preprocedure Note    Indication for procedure: The encounter diagnosis was Thyroid nodule.    Relevant review of systems: NA    Relevant Labs:   Lab Results   Component Value Date    CREATININE 0.83 09/21/2023    INR 1.0 09/21/2023    PROTIME 11.7 09/21/2023       Planned Sedation/Anesthesia: Moderate    Airway assessment: normal    Directed physical examination:    AO*3, neck soft and nontender, breathing comfortably on RA    Mallampati: II (hard and soft palate, upper portion of tonsils anduvula visible)    ASA Score: ASA 2 - Patient with mild systemic disease with no functional limitations    Benefits, risks and alternatives of procedure and planned sedation have been discussed with the patient and/or their representative. All questions answered and they agree to proceed.

## 2023-11-17 ASSESSMENT — ENCOUNTER SYMPTOMS
UNEXPECTED WEIGHT CHANGE: 1
MUSCULOSKELETAL NEGATIVE: 1
PSYCHIATRIC NEGATIVE: 1
CARDIOVASCULAR NEGATIVE: 1
NEUROLOGICAL NEGATIVE: 1
RESPIRATORY NEGATIVE: 1
ALLERGIC/IMMUNOLOGIC NEGATIVE: 1
GASTROINTESTINAL NEGATIVE: 1
EYES NEGATIVE: 1
ENDOCRINE NEGATIVE: 1
HEMATOLOGIC/LYMPHATIC NEGATIVE: 1

## 2023-11-21 ENCOUNTER — TELEPHONE (OUTPATIENT)
Dept: PRIMARY CARE | Facility: CLINIC | Age: 48
End: 2023-11-21

## 2023-11-21 NOTE — TELEPHONE ENCOUNTER
Pt would like a call back regarding her biopsy results. She is also having side effects from her metformin. [phone] thanks!

## 2023-11-21 NOTE — PROGRESS NOTES
"Called patient on 11/21/23 Regarding second thyroid Bx resulting in findings c/w benign follicular nodule. Discussed with patient cells are non-malignant, meaning non-cancerous. Discussed obtaining thyroid US every 12 months to monitor. Patient reported she wanted a second opinion since she thinks thyroid nodules have grown and is contributing to her \"puffy face.\" Patient had recent visit with another primary care provider who suggested weight loss pharmaceuticals for recent weight gain. Discussed with patient that is an option and to schedule an appointment to further discuss.   "

## 2023-11-22 LAB
LABORATORY COMMENT REPORT: NORMAL
PATH REPORT.FINAL DX SPEC: NORMAL
PATH REPORT.GROSS SPEC: NORMAL
PATH REPORT.RELEVANT HX SPEC: NORMAL
PATH REPORT.TOTAL CANCER: NORMAL

## 2023-12-05 ENCOUNTER — LAB (OUTPATIENT)
Dept: LAB | Facility: LAB | Age: 48
End: 2023-12-05
Payer: COMMERCIAL

## 2023-12-05 ENCOUNTER — OFFICE VISIT (OUTPATIENT)
Dept: PRIMARY CARE | Facility: CLINIC | Age: 48
End: 2023-12-05
Payer: COMMERCIAL

## 2023-12-05 VITALS
HEIGHT: 63 IN | SYSTOLIC BLOOD PRESSURE: 114 MMHG | WEIGHT: 190.31 LBS | DIASTOLIC BLOOD PRESSURE: 79 MMHG | HEART RATE: 99 BPM | BODY MASS INDEX: 33.72 KG/M2

## 2023-12-05 DIAGNOSIS — R73.03 PREDIABETES: Primary | ICD-10-CM

## 2023-12-05 DIAGNOSIS — R73.03 PREDIABETES: ICD-10-CM

## 2023-12-05 DIAGNOSIS — E66.9 CLASS 1 OBESITY: ICD-10-CM

## 2023-12-05 PROCEDURE — 99214 OFFICE O/P EST MOD 30 MIN: CPT | Performed by: FAMILY MEDICINE

## 2023-12-05 PROCEDURE — 83036 HEMOGLOBIN GLYCOSYLATED A1C: CPT

## 2023-12-05 PROCEDURE — 36415 COLL VENOUS BLD VENIPUNCTURE: CPT

## 2023-12-05 ASSESSMENT — ENCOUNTER SYMPTOMS
LOSS OF SENSATION IN FEET: 0
OCCASIONAL FEELINGS OF UNSTEADINESS: 0
DEPRESSION: 1

## 2023-12-05 ASSESSMENT — PAIN SCALES - GENERAL: PAINLEVEL: 0-NO PAIN

## 2023-12-05 ASSESSMENT — COLUMBIA-SUICIDE SEVERITY RATING SCALE - C-SSRS
2. HAVE YOU ACTUALLY HAD ANY THOUGHTS OF KILLING YOURSELF?: NO
1. IN THE PAST MONTH, HAVE YOU WISHED YOU WERE DEAD OR WISHED YOU COULD GO TO SLEEP AND NOT WAKE UP?: NO
6. HAVE YOU EVER DONE ANYTHING, STARTED TO DO ANYTHING, OR PREPARED TO DO ANYTHING TO END YOUR LIFE?: NO

## 2023-12-05 NOTE — PROGRESS NOTES
Marquita Pimentel I a 48 y.o. female here for initial evaluation for treatment of obesity.    Past Medical History:   Diagnosis Date    Other specified anxiety disorders 2021    Depression with anxiety    Personal history of transient ischemic attack (TIA), and cerebral infarction without residual deficits 2021    History of cerebrovascular accident       Tobacco Use: High Risk (2023)    Patient History     Smoking Tobacco Use: Some Days     Smokeless Tobacco Use: Never     Passive Exposure: Not on file     Alcohol Use: Not on file   She smokes 3 Black and Mild cigarettes a day, and expressed little interest in quitting.   She has a history of pre-diabetes, TIA and gallstones.   She has a surgical history of cholecystectomy roughly one year ago.     The family history is significant for a mother who  at age 67 from pancreatic cancer but who also had COPD and established heart disease. Her father is 72 and has COPD and kidney disease. She has one 54 year-old brother with heart disease and COPD. She has four healthy children. She lives alone.     Prior weight loss attempts include. Self-directed physical activity. She walks on a treadmill 3-4 times a week for forty minutes at a time.     On a typical day, she wakes up at noon. She is on longterm disability for depression and a history of impulsive behavior. She eats a dinner like meal around 5PM. She has a snack mid afternoon. This is her usual eating pattern. She drinks water and fruit juice.     Breakfast habits: Skips breakfast    Her other eating habits include     Her beverage habits include:  Water and fruit juice.     Her physical activity habits includeVigorous physical activity 3-4 times per week      Her motivation is. Prevent future cardiovascular risks. and Feel more energetic    She feels well.    On physical examination   Vitals:    23 1157   BP: 114/79   Pulse: 99       Body mass index is 33.71 kg/m².      Overall Impression:  Interesting woman with unusual habits. To improve her overall cardiovascula risk, she should quit smoking in addition to losing weight. Will repeat HBA1C% today to confirm pre-diabetes. She is concerned about her thyroid nodule which was biopsied (follicular nodule). Thyroid function is normal.     Goals included our Standard Fitter me goals with implementation counseling by Dr. Ezekiel Gonzalez. Followup in 3  months.     No food or drink after 7PM, Drink only water at all times., Have a protein-rich breakfast within 30 minutes of waking up., and Thirty minutes of continuous physical activity 7 days a week.

## 2023-12-06 LAB
EST. AVERAGE GLUCOSE BLD GHB EST-MCNC: 120 MG/DL
HBA1C MFR BLD: 5.8 %

## 2023-12-12 DIAGNOSIS — E66.9 OBESITY (BMI 30-39.9): Primary | ICD-10-CM

## 2023-12-12 NOTE — TELEPHONE ENCOUNTER
Pt called requesting rx for yeast infection presenting secondary to ATB therapy. Pt experiencing symptoms. Message sent to provider.

## 2023-12-13 DIAGNOSIS — B37.31 VAGINAL YEAST INFECTION: Primary | ICD-10-CM

## 2023-12-13 RX ORDER — FLUCONAZOLE 150 MG/1
150 TABLET ORAL ONCE
Qty: 1 TABLET | Refills: 0 | Status: SHIPPED | OUTPATIENT
Start: 2023-12-13 | End: 2023-12-13

## 2023-12-13 NOTE — PROGRESS NOTES
Patient called with complaints of yeast infection after recent antibiotic use. (Last documentation found was doxycycline 10/20/23). Ordered one time fluconazole 150mg tablet.

## 2023-12-18 ENCOUNTER — APPOINTMENT (OUTPATIENT)
Dept: PLASTIC SURGERY | Facility: CLINIC | Age: 48
End: 2023-12-18
Payer: COMMERCIAL

## 2024-01-02 ENCOUNTER — OFFICE VISIT (OUTPATIENT)
Dept: PRIMARY CARE | Facility: CLINIC | Age: 49
End: 2024-01-02
Payer: COMMERCIAL

## 2024-01-02 VITALS
SYSTOLIC BLOOD PRESSURE: 123 MMHG | WEIGHT: 186 LBS | HEIGHT: 63 IN | HEART RATE: 78 BPM | BODY MASS INDEX: 32.96 KG/M2 | DIASTOLIC BLOOD PRESSURE: 85 MMHG | OXYGEN SATURATION: 97 %

## 2024-01-02 DIAGNOSIS — E78.5 DYSLIPIDEMIA: ICD-10-CM

## 2024-01-02 DIAGNOSIS — E66.9 OBESITY (BMI 30-39.9): ICD-10-CM

## 2024-01-02 DIAGNOSIS — E04.1 THYROID NODULE: ICD-10-CM

## 2024-01-02 DIAGNOSIS — K21.9 GASTROESOPHAGEAL REFLUX DISEASE WITHOUT ESOPHAGITIS: ICD-10-CM

## 2024-01-02 DIAGNOSIS — Z13.6 SCREENING FOR HEART DISEASE: ICD-10-CM

## 2024-01-02 DIAGNOSIS — R73.03 PREDIABETES: Primary | ICD-10-CM

## 2024-01-02 DIAGNOSIS — M25.561 ACUTE PAIN OF RIGHT KNEE: ICD-10-CM

## 2024-01-02 PROCEDURE — 99214 OFFICE O/P EST MOD 30 MIN: CPT | Performed by: INTERNAL MEDICINE

## 2024-01-02 RX ORDER — DULAGLUTIDE 0.75 MG/.5ML
0.75 INJECTION, SOLUTION SUBCUTANEOUS
Qty: 2 ML | Refills: 2 | Status: SHIPPED | OUTPATIENT
Start: 2024-01-02 | End: 2024-04-09 | Stop reason: ALTCHOICE

## 2024-01-02 RX ORDER — DICLOFENAC SODIUM 10 MG/G
4 GEL TOPICAL 4 TIMES DAILY
Qty: 100 G | Refills: 1 | Status: SHIPPED | OUTPATIENT
Start: 2024-01-02 | End: 2024-04-09 | Stop reason: WASHOUT

## 2024-01-02 RX ORDER — PRAVASTATIN SODIUM 20 MG/1
1 TABLET ORAL NIGHTLY
COMMUNITY
Start: 2015-09-11 | End: 2024-04-09 | Stop reason: SDUPTHER

## 2024-01-02 RX ORDER — SIMETHICONE 80 MG
80 TABLET,CHEWABLE ORAL EVERY 6 HOURS PRN
Qty: 120 TABLET | Refills: 0 | Status: SHIPPED | OUTPATIENT
Start: 2024-01-02 | End: 2025-01-01

## 2024-01-02 ASSESSMENT — ENCOUNTER SYMPTOMS
OCCASIONAL FEELINGS OF UNSTEADINESS: 0
LOSS OF SENSATION IN FEET: 0
DEPRESSION: 0

## 2024-01-02 NOTE — PROGRESS NOTES
"Subjective   Patient ID: Marquita To is a 48 y.o. female who presents for Follow-up (On weight loss shots and check breathing ), Med Refill (simethicone), and Bleeding/Bruising (Right knee ).    HPI patient presents to clinic in order to get established with the office.  She had been a regular patient of Dr. Kwok.  She is complaining of right knee pain as a result of fall for the past 1 week.  She is also concerned about her obesity and is requesting medication regarding her history of prediabetes and obesity.  She denies any swelling of the limb, fever, bruising, shortness of breath and headache.    Past medical history significant for acne vulgaris, morbid obesity, prediabetes, thyroid nodule, status post fine-needle aspiration revealed follicular nodular disease, marijuana use, dyslipidemia, GERD, TIA, status post vaginal yeast infection and depression with anxiety  Past surgical history notable for colonoscopy in 2021 revealed  inflammatory bowel disease, status post laparoscopic cholecystectomy, and benign lump removal from right breast       Review of Systems   Constitutional: Negative.    HENT: Negative.     Eyes: Negative.    Respiratory: Negative.     Cardiovascular: Negative.    Gastrointestinal: Negative.    Endocrine: Negative.    Genitourinary: Negative.    Musculoskeletal:  Positive for arthralgias.   Skin: Negative.    Allergic/Immunologic: Negative.    Neurological: Negative.    Hematological: Negative.    Psychiatric/Behavioral: Negative.         Objective   /85   Pulse 78   Ht 1.6 m (5' 3\")   Wt 84.4 kg (186 lb)   SpO2 97%   BMI 32.95 kg/m²     Physical Exam  Constitutional:       Appearance: Normal appearance. She is obese.   HENT:      Right Ear: Tympanic membrane normal.      Left Ear: Tympanic membrane and ear canal normal.      Nose: Nose normal.   Neck:      Vascular: No carotid bruit.   Cardiovascular:      Rate and Rhythm: Normal rate.   Pulmonary:      Effort: No respiratory " distress.      Breath sounds: No stridor. No wheezing.   Abdominal:      Palpations: Abdomen is soft.      Tenderness: There is no guarding or rebound.   Skin:     Coloration: Skin is not jaundiced.      Findings: No bruising.   Neurological:      General: No focal deficit present.      Mental Status: She is alert and oriented to person, place, and time.   Psychiatric:         Mood and Affect: Mood normal.         Assessment/Plan    patient will be scheduled for ultrasound of the thyroid gland regarding her history of thyroid nodule.  She is also advised to obtain CT of the for cardiac scoring regarding screening for heart disease.  She is given trial of diclofenac gel regarding her right knee pain.  She is also encouraged to put some ice pack in view of recent fall and knee pain.  She will be started on Trulicity is 0.5 mg weekly regarding her history of prediabetes and obesity.  She is encouraged to diet and exercise.  She will continue other medications and will return to clinic in 3 months for follow-up visit.

## 2024-01-02 NOTE — PROGRESS NOTES
"Subjective   Patient ID: Marquita To is a 48 y.o. female who presents for Follow-up (On weight loss shots and check breathing ), Med Refill (simethicone), and Bleeding/Bruising (Right knee ).    HPI     Review of Systems    Objective   /85   Pulse 78   Ht 1.6 m (5' 3\")   Wt 86.2 kg (190 lb)   SpO2 97%   BMI 33.66 kg/m²     Physical Exam    Assessment/Plan          "

## 2024-01-04 ENCOUNTER — TELEPHONE (OUTPATIENT)
Dept: PRIMARY CARE | Facility: CLINIC | Age: 49
End: 2024-01-04
Payer: COMMERCIAL

## 2024-01-04 NOTE — TELEPHONE ENCOUNTER
dulaglutide (Trulicity) 0.75 mg/0.5 mL pen injector     Is not covered by insurance  and wants something else

## 2024-01-05 ENCOUNTER — TELEPHONE (OUTPATIENT)
Dept: PRIMARY CARE | Facility: CLINIC | Age: 49
End: 2024-01-05
Payer: COMMERCIAL

## 2024-01-06 ASSESSMENT — ENCOUNTER SYMPTOMS
ALLERGIC/IMMUNOLOGIC NEGATIVE: 1
NEUROLOGICAL NEGATIVE: 1
GASTROINTESTINAL NEGATIVE: 1
PSYCHIATRIC NEGATIVE: 1
CARDIOVASCULAR NEGATIVE: 1
ARTHRALGIAS: 1
CONSTITUTIONAL NEGATIVE: 1
EYES NEGATIVE: 1
HEMATOLOGIC/LYMPHATIC NEGATIVE: 1
RESPIRATORY NEGATIVE: 1
ENDOCRINE NEGATIVE: 1

## 2024-01-08 ENCOUNTER — TELEPHONE (OUTPATIENT)
Dept: PRIMARY CARE | Facility: CLINIC | Age: 49
End: 2024-01-08
Payer: COMMERCIAL

## 2024-01-08 DIAGNOSIS — K21.9 GASTROESOPHAGEAL REFLUX DISEASE WITHOUT ESOPHAGITIS: Primary | ICD-10-CM

## 2024-01-08 RX ORDER — ONDANSETRON 4 MG/1
4 TABLET, FILM COATED ORAL EVERY 8 HOURS PRN
Qty: 20 TABLET | Refills: 0 | Status: SHIPPED | OUTPATIENT
Start: 2024-01-08 | End: 2024-01-15

## 2024-01-12 ENCOUNTER — APPOINTMENT (OUTPATIENT)
Dept: PRIMARY CARE | Facility: CLINIC | Age: 49
End: 2024-01-12
Payer: COMMERCIAL

## 2024-01-24 DIAGNOSIS — E66.9 OBESITY (BMI 30-39.9): ICD-10-CM

## 2024-01-24 DIAGNOSIS — R73.03 PREDIABETES: ICD-10-CM

## 2024-01-24 RX ORDER — SEMAGLUTIDE 0.68 MG/ML
0.25 INJECTION, SOLUTION SUBCUTANEOUS
Qty: 3 ML | Refills: 1 | Status: SHIPPED | OUTPATIENT
Start: 2024-01-24 | End: 2024-01-25 | Stop reason: SDUPTHER

## 2024-01-25 DIAGNOSIS — E66.9 OBESITY (BMI 30-39.9): ICD-10-CM

## 2024-01-25 DIAGNOSIS — R73.03 PREDIABETES: ICD-10-CM

## 2024-01-25 RX ORDER — SEMAGLUTIDE 0.68 MG/ML
0.25 INJECTION, SOLUTION SUBCUTANEOUS
Qty: 3 ML | Refills: 1 | Status: SHIPPED | OUTPATIENT
Start: 2024-01-25 | End: 2024-04-09 | Stop reason: SDUPTHER

## 2024-01-26 ENCOUNTER — DOCUMENTATION (OUTPATIENT)
Dept: DERMATOLOGY | Facility: CLINIC | Age: 49
End: 2024-01-26

## 2024-01-26 ENCOUNTER — OFFICE VISIT (OUTPATIENT)
Dept: DERMATOLOGY | Facility: CLINIC | Age: 49
End: 2024-01-26
Payer: COMMERCIAL

## 2024-01-26 DIAGNOSIS — L70.0 ACNE VULGARIS: ICD-10-CM

## 2024-01-26 DIAGNOSIS — L66.9 CICATRICIAL ALOPECIA: Primary | ICD-10-CM

## 2024-01-26 PROCEDURE — 99214 OFFICE O/P EST MOD 30 MIN: CPT | Performed by: DERMATOLOGY

## 2024-01-26 RX ORDER — BENZOYL PEROXIDE 50 MG/ML
LIQUID TOPICAL
Qty: 227 G | Refills: 3 | Status: SHIPPED | OUTPATIENT
Start: 2024-01-26

## 2024-01-26 RX ORDER — TRETINOIN 0.5 MG/G
CREAM TOPICAL
Qty: 20 G | Refills: 2 | Status: SHIPPED | OUTPATIENT
Start: 2024-01-26

## 2024-01-26 RX ORDER — SPIRONOLACTONE 100 MG/1
TABLET, FILM COATED ORAL
Qty: 30 TABLET | Refills: 3 | Status: SHIPPED | OUTPATIENT
Start: 2024-01-26

## 2024-01-26 RX ORDER — CLINDAMYCIN PHOSPHATE 10 MG/G
GEL TOPICAL
Qty: 60 G | Refills: 3 | Status: SHIPPED | OUTPATIENT
Start: 2024-01-26

## 2024-01-26 ASSESSMENT — DERMATOLOGY PATIENT ASSESSMENT
HAVE YOU HAD OR DO YOU HAVE VASCULAR DISEASE: NO
DO YOU HAVE IRREGULAR MENSTRUAL CYCLES: NO
DO YOU HAVE ANY NEW OR CHANGING LESIONS: NO
HAVE YOU HAD OR DO YOU HAVE A STAPH INFECTION: NO
ARE YOU AN ORGAN TRANSPLANT RECIPIENT: NO
ARE YOU ON BIRTH CONTROL: NO
FOR PATIENTS COMING IN FOR A FOLLOW-UP VISIT - HAVE THERE BEEN ANY CHANGES IN YOUR HEALTH SINCE YOUR LAST VISIT: NO
DO YOU USE A TANNING BED: NO
ARE YOU TRYING TO GET PREGNANT: NO

## 2024-01-26 ASSESSMENT — DERMATOLOGY QUALITY OF LIFE (QOL) ASSESSMENT
WHAT SINGLE SKIN CONDITION LISTED BELOW IS THE PATIENT ANSWERING THE QUALITY-OF-LIFE ASSESSMENT QUESTIONS ABOUT: ACNE
RATE HOW EMOTIONALLY BOTHERED YOU ARE BY YOUR SKIN PROBLEM (FOR EXAMPLE, WORRY, EMBARRASSMENT, FRUSTRATION): 6 - ALWAYS BOTHERED
DATE THE QUALITY-OF-LIFE ASSESSMENT WAS COMPLETED: 66865
RATE HOW BOTHERED YOU ARE BY EFFECTS OF YOUR SKIN PROBLEMS ON YOUR ACTIVITIES (EG, GOING OUT, ACCOMPLISHING WHAT YOU WANT, WORK ACTIVITIES OR YOUR RELATIONSHIPS WITH OTHERS): 0 - NEVER BOTHERED
RATE HOW BOTHERED YOU ARE BY SYMPTOMS OF YOUR SKIN PROBLEM (EG, ITCHING, STINGING BURNING, HURTING OR SKIN IRRITATION): 6 - ALWAYS BOTHERED

## 2024-01-26 ASSESSMENT — PATIENT GLOBAL ASSESSMENT (PGA): PATIENT GLOBAL ASSESSMENT: PATIENT GLOBAL ASSESSMENT:  3 - MODERATE

## 2024-01-26 ASSESSMENT — ITCH NUMERIC RATING SCALE: HOW SEVERE IS YOUR ITCHING?: 0

## 2024-01-26 NOTE — PROGRESS NOTES
HAD MI AND TRIPLE BYPASS 1/2020. STOPPED BLOOD THINNERS 2 WEEKS AGO, ONLY ON ASA. Subjective     Marquita To is a 48 y.o. female who presents for the following: Acne (Pt here to follow up for Acne located on the face. Pt currently using BPO, Clindamycin Gel, Tretinoin 0.025%, and Doxycycline 100 mg BID. Pt reports condition is the same. 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 Pt currently using /Clobetasol (Temovate) 0.05 % external solution. Pt reports improvement. /).     Review of Systems:  No other skin or systemic complaints other than what is documented elsewhere in the note.    The following portions of the chart were reviewed this encounter and updated as appropriate:          Skin Cancer History  No skin cancer on file.      Specialty Problems          Dermatology Problems    Acne        Objective   Well appearing patient in no apparent distress; mood and affect are within normal limits.    A focused skin examination was performed. All findings within normal limits unless otherwise noted below.    Assessment/Plan   1. Cicatricial alopecia  Mid Frontal Scalp  Loss of follicular ostia with some hair re-growth in the frontotemporal area    Central Centrifugal Cicatricial Alopecia (CCCA) is a form of scarring alopecia on the scalp that results in permanent hair loss. It is the most common form of scarring hair loss seen in black women. However, it may be seen in men and among persons of all races and hair colour (though rarely). Middle-aged women are most commonly affected.     The exact cause of CCCA is unknown and is likely multifactorial. A genetic component has been suggested.     Treatment is to preserve residual hair and prevent progression.     Continue topical clobetasol 0.05% scalp solution 2x daily 2 weeks on 2 weeks off. Side effects of toical steroids reviewed including skin atrophy.    Recommend patient start OTC minoxidil 5% solution to help thicken the hair.      We will re-give prescription for cranial prosthesis from [facility] (Patient previously  requested an email and states she lost prescription). New prescription provided today - she is aware she will first have to purchase and then submit for reimbursement to insurance.    Related Medications  clobetasol (Temovate) 0.05 % external solution  Apply to scalp 2x daily x 2wks on 2 wks off    2. Acne vulgaris  Head - Anterior (Face)  Scattered inflammatory hyperpigmented papules on the lower cheeks and jawline, ice pick scars no bilateral cheeks    The chronic and intermittently flaring nature of acne was reviewed with the patient today. Patient advised that acne is multifactorial. Treatment options were reviewed with the patient. Advised that typically takes 2-3 months to see 60-80% improvement and treatments may worsen acne appearance prior to improvement.     Wash face twice daily  Do not spot treat, treat the entire surface area to treat current breakouts and prevent new breakouts    Treatment recommendations:  -Stop doxycyline  -START spironolactone 100 mg daily. Will obtain BMP in 3-4 weeks.   -Continue BPO wash daily  -Continue clindamycin gel in the morning   -Continue tretinoin 0.05% cream nightly (counseled patient- do not spot treat, treat whole face)    Side effects of spironolactone were reviewed including increased potassium levels, gynecomastia, breast tenderness, abnormalities in menstrual cycle, birth defects. In mice, studies have seen an increased risk of kidney cancer.    Side effects of topical retinoids reviewed including increased photosensitivity, dryness, irritation and redness. To help alleviate side effects patient advised to apply initially every 2-3 nights and increase to daily as tolerated or apply topical non-comedogenic moisturizer prior to application.        spironolactone (Aldactone) 100 mg tablet - Head - Anterior (Face)  Take 1 pill by mouth daily    Related Procedures  Follow Up In Dermatology - Established Patient  Basic metabolic panel    Related Medications  benzoyl  peroxide 5 % external wash  Lather on face,chest, back daily in shower    tretinoin (Retin-A) 0.05 % cream  Apply a pea sized amount every other day increase to daily as tolerated    clindamycin (Cleocin T) 1 % gel  Apply to face daily in morning    Trey Phillips MD  PGY-4 Dermatology    Follow up in 4-5 months.    I saw and evaluated the patient. I personally obtained the key and critical portions of the history and physical exam or was physically present for key and critical portions performed by the resident/fellow. I reviewed the resident/fellow's documentation and discussed the patient with the resident/fellow. I agree with the resident/fellow's medical decision making as documented in the note.    Kendra De La Garza, DO

## 2024-01-29 ENCOUNTER — DOCUMENTATION (OUTPATIENT)
Dept: DERMATOLOGY | Facility: CLINIC | Age: 49
End: 2024-01-29
Payer: COMMERCIAL

## 2024-01-29 DIAGNOSIS — L66.9 CICATRICIAL ALOPECIA: ICD-10-CM

## 2024-01-29 RX ORDER — CLOBETASOL PROPIONATE 0.46 MG/ML
SOLUTION TOPICAL
Qty: 50 ML | Refills: 1 | Status: SHIPPED | OUTPATIENT
Start: 2024-01-29 | End: 2024-04-09 | Stop reason: SDUPTHER

## 2024-01-29 NOTE — PROGRESS NOTES
PA for Tretinoin cream has been approved. Pharmacy and pt to be notified.  **Approved on January 26. Request Reference Number: [id number]. TRETINOIN CRE 0.05% is approved through 12/31/2024. Your patient may now fill this prescription and it will be covered

## 2024-02-06 ENCOUNTER — LAB (OUTPATIENT)
Dept: LAB | Facility: LAB | Age: 49
End: 2024-02-06
Payer: COMMERCIAL

## 2024-02-06 DIAGNOSIS — L70.0 ACNE VULGARIS: ICD-10-CM

## 2024-02-06 LAB
ANION GAP SERPL CALC-SCNC: 13 MMOL/L (ref 10–20)
BUN SERPL-MCNC: 8 MG/DL (ref 6–23)
CALCIUM SERPL-MCNC: 9.4 MG/DL (ref 8.6–10.6)
CHLORIDE SERPL-SCNC: 104 MMOL/L (ref 98–107)
CO2 SERPL-SCNC: 24 MMOL/L (ref 21–32)
CREAT SERPL-MCNC: 0.74 MG/DL (ref 0.5–1.05)
EGFRCR SERPLBLD CKD-EPI 2021: >90 ML/MIN/1.73M*2
GLUCOSE SERPL-MCNC: 87 MG/DL (ref 74–99)
POTASSIUM SERPL-SCNC: 4.1 MMOL/L (ref 3.5–5.3)
SODIUM SERPL-SCNC: 137 MMOL/L (ref 136–145)

## 2024-02-06 PROCEDURE — 80048 BASIC METABOLIC PNL TOTAL CA: CPT

## 2024-02-06 PROCEDURE — 36415 COLL VENOUS BLD VENIPUNCTURE: CPT

## 2024-02-07 DIAGNOSIS — L70.0 ACNE VULGARIS: Primary | ICD-10-CM

## 2024-02-07 DIAGNOSIS — Z79.899 ENCOUNTER FOR LONG-TERM (CURRENT) USE OF MEDICATIONS: ICD-10-CM

## 2024-02-07 NOTE — RESULT ENCOUNTER NOTE
BMP without abnormalities, was suppose to have drawn 2-3 weeks after starting, so we will repeat in 4-6 weeks (order is placed)

## 2024-02-07 NOTE — RESULT ENCOUNTER NOTE
Spoke to Pt. Pt aware of results and instructions. Pt states she has not started Spironolactone. Pt informed that she needs to take medication for 4-6 weeks and then get labs drawn. Pt expresses understanding. No further questions/concerns.

## 2024-03-21 ENCOUNTER — TELEPHONE (OUTPATIENT)
Dept: PRIMARY CARE | Facility: CLINIC | Age: 49
End: 2024-03-21
Payer: COMMERCIAL

## 2024-03-21 NOTE — TELEPHONE ENCOUNTER
Copied from CRM #[id number]. Topic: Transfer to Department for Scheduling  >> Mar 13, 2024 11:24 AM Giulia MADSEN wrote:  Patient was seen last December and was to to follow up in 3 months, the was provided with the next appointment day and would like to speak with office for an earlier appointment

## 2024-03-28 ENCOUNTER — TELEPHONE (OUTPATIENT)
Dept: DERMATOLOGY | Facility: CLINIC | Age: 49
End: 2024-03-28
Payer: COMMERCIAL

## 2024-04-09 ENCOUNTER — OFFICE VISIT (OUTPATIENT)
Dept: PRIMARY CARE | Facility: CLINIC | Age: 49
End: 2024-04-09
Payer: COMMERCIAL

## 2024-04-09 VITALS
OXYGEN SATURATION: 96 % | BODY MASS INDEX: 32.07 KG/M2 | HEIGHT: 63 IN | HEART RATE: 71 BPM | DIASTOLIC BLOOD PRESSURE: 80 MMHG | SYSTOLIC BLOOD PRESSURE: 118 MMHG | WEIGHT: 181 LBS

## 2024-04-09 DIAGNOSIS — Z00.00 ROUTINE GENERAL MEDICAL EXAMINATION AT HEALTH CARE FACILITY: Primary | ICD-10-CM

## 2024-04-09 DIAGNOSIS — Z12.31 ENCOUNTER FOR SCREENING MAMMOGRAM FOR BREAST CANCER: ICD-10-CM

## 2024-04-09 DIAGNOSIS — E78.5 DYSLIPIDEMIA: ICD-10-CM

## 2024-04-09 DIAGNOSIS — Z13.6 SCREENING FOR CARDIOVASCULAR CONDITION: ICD-10-CM

## 2024-04-09 DIAGNOSIS — Z12.12 SCREENING FOR COLORECTAL CANCER: ICD-10-CM

## 2024-04-09 DIAGNOSIS — R73.03 PREDIABETES: ICD-10-CM

## 2024-04-09 DIAGNOSIS — E04.1 THYROID NODULE: ICD-10-CM

## 2024-04-09 DIAGNOSIS — Z12.11 SCREENING FOR COLORECTAL CANCER: ICD-10-CM

## 2024-04-09 DIAGNOSIS — L66.9 CICATRICIAL ALOPECIA: ICD-10-CM

## 2024-04-09 DIAGNOSIS — M54.2 NECK PAIN: ICD-10-CM

## 2024-04-09 DIAGNOSIS — E66.9 OBESITY (BMI 30-39.9): ICD-10-CM

## 2024-04-09 PROCEDURE — G0402 INITIAL PREVENTIVE EXAM: HCPCS | Performed by: INTERNAL MEDICINE

## 2024-04-09 RX ORDER — CLOBETASOL PROPIONATE 0.46 MG/ML
SOLUTION TOPICAL
Qty: 50 ML | Refills: 1 | Status: SHIPPED | OUTPATIENT
Start: 2024-04-09 | End: 2024-06-05 | Stop reason: SDUPTHER

## 2024-04-09 RX ORDER — SEMAGLUTIDE 0.68 MG/ML
0.5 INJECTION, SOLUTION SUBCUTANEOUS
Qty: 3 ML | Refills: 1 | Status: SHIPPED | OUTPATIENT
Start: 2024-04-09 | End: 2024-05-17 | Stop reason: SDUPTHER

## 2024-04-09 RX ORDER — PRAVASTATIN SODIUM 20 MG/1
20 TABLET ORAL NIGHTLY
Qty: 90 TABLET | Refills: 1 | Status: SHIPPED | OUTPATIENT
Start: 2024-04-09 | End: 2025-04-09

## 2024-04-09 RX ORDER — CYCLOBENZAPRINE HCL 10 MG
10 TABLET ORAL 2 TIMES DAILY
Qty: 28 TABLET | Refills: 0 | Status: SHIPPED | OUTPATIENT
Start: 2024-04-09 | End: 2024-04-23

## 2024-04-09 ASSESSMENT — PATIENT HEALTH QUESTIONNAIRE - PHQ9
2. FEELING DOWN, DEPRESSED OR HOPELESS: NOT AT ALL
2. FEELING DOWN, DEPRESSED OR HOPELESS: NOT AT ALL
SUM OF ALL RESPONSES TO PHQ9 QUESTIONS 1 AND 2: 0
1. LITTLE INTEREST OR PLEASURE IN DOING THINGS: NOT AT ALL
SUM OF ALL RESPONSES TO PHQ9 QUESTIONS 1 AND 2: 0
1. LITTLE INTEREST OR PLEASURE IN DOING THINGS: NOT AT ALL

## 2024-04-09 ASSESSMENT — COLUMBIA-SUICIDE SEVERITY RATING SCALE - C-SSRS
6. HAVE YOU EVER DONE ANYTHING, STARTED TO DO ANYTHING, OR PREPARED TO DO ANYTHING TO END YOUR LIFE?: NO
1. IN THE PAST MONTH, HAVE YOU WISHED YOU WERE DEAD OR WISHED YOU COULD GO TO SLEEP AND NOT WAKE UP?: NO
2. HAVE YOU ACTUALLY HAD ANY THOUGHTS OF KILLING YOURSELF?: NO

## 2024-04-09 ASSESSMENT — ACTIVITIES OF DAILY LIVING (ADL)
TAKING_MEDICATION: INDEPENDENT
DRESSING: INDEPENDENT
DOING_HOUSEWORK: INDEPENDENT
GROCERY_SHOPPING: INDEPENDENT
MANAGING_FINANCES: INDEPENDENT
BATHING: INDEPENDENT

## 2024-04-09 ASSESSMENT — ENCOUNTER SYMPTOMS
OCCASIONAL FEELINGS OF UNSTEADINESS: 0
DEPRESSION: 0
LOSS OF SENSATION IN FEET: 0

## 2024-04-09 NOTE — PROGRESS NOTES
"Subjective   Patient ID: Marquita To is a 48 y.o. female who presents for Medicare Annual Wellness Visit Subsequent (Ozempic needs to be 0.5 mg ).    HPI     Review of Systems    Objective   /80 (BP Location: Right arm, Patient Position: Sitting, BP Cuff Size: Small adult)   Pulse 71   Ht 1.6 m (5' 3\")   Wt 82.1 kg (181 lb)   SpO2 96%   BMI 32.06 kg/m²     Physical Exam    Assessment/Plan          "

## 2024-04-09 NOTE — PROGRESS NOTES
"Subjective   Reason for Visit: Marquita To is an 48 y.o. female here for a Medicare Wellness visit.               HPI patient presents to clinic for follow-up visit on history of  acne vulgaris, morbid obesity, prediabetes, thyroid nodule, status post fine-needle aspiration revealed follicular nodular disease, marijuana use, dyslipidemia, GERD, TIA, status post vaginal yeast infection and depression with anxiety .  She is also here for Medicare wellness exam.  She is doing fairly well and denies any cough, congestion, chest pain, fever chills and palpitation.  She forgot to get any blood work and ultrasound of the thyroid gland done.    Patient Care Team:  Jeff Avilez MD as PCP - General (Internal Medicine)  Chemo Paula MD as PCP - [facility] Medicaid PCP     Review of Systems   Constitutional: Negative.    HENT: Negative.     Eyes: Negative.    Respiratory: Negative.     Cardiovascular: Negative.    Gastrointestinal: Negative.    Endocrine: Negative.    Genitourinary: Negative.    Musculoskeletal: Negative.    Skin: Negative.    Allergic/Immunologic: Negative.    Neurological: Negative.    Hematological: Negative.    Psychiatric/Behavioral: Negative.         Objective   Vitals:  /80 (BP Location: Right arm, Patient Position: Sitting, BP Cuff Size: Small adult)   Pulse 71   Ht 1.6 m (5' 3\")   Wt 82.1 kg (181 lb)   SpO2 96%   BMI 32.06 kg/m²       Physical Exam  Constitutional:       Appearance: Normal appearance. She is obese.   HENT:      Right Ear: Tympanic membrane normal.      Left Ear: Tympanic membrane and ear canal normal.      Nose: Nose normal.   Neck:      Vascular: No carotid bruit.   Cardiovascular:      Rate and Rhythm: Normal rate.   Pulmonary:      Effort: No respiratory distress.      Breath sounds: No stridor. No wheezing.   Abdominal:      Palpations: Abdomen is soft.      Tenderness: There is no abdominal tenderness. There is no guarding or rebound.   Skin:     Coloration: Skin is " not jaundiced.      Findings: No bruising.   Neurological:      General: No focal deficit present.      Mental Status: She is alert and oriented to person, place, and time.   Psychiatric:         Mood and Affect: Mood normal.         Assessment/Plan   Problem List Items Addressed This Visit       Prediabetes     Other Visit Diagnoses       Obesity (BMI 30-39.9)            Patient will be scheduled please schedule for CT cardiac scoring regarding screening for heart disease, mammogram for breast cancer screening and colonoscopy for colon cancer screening.  She will be also scheduled for routine blood work and ultrasound of the thyroid regarding history of thyroid nodule.  She will continue current medications and will return to clinic in months for follow-up visit.

## 2024-04-12 ASSESSMENT — ENCOUNTER SYMPTOMS
CONSTITUTIONAL NEGATIVE: 1
CARDIOVASCULAR NEGATIVE: 1
HEMATOLOGIC/LYMPHATIC NEGATIVE: 1
GASTROINTESTINAL NEGATIVE: 1
RESPIRATORY NEGATIVE: 1
EYES NEGATIVE: 1
PSYCHIATRIC NEGATIVE: 1
ENDOCRINE NEGATIVE: 1
MUSCULOSKELETAL NEGATIVE: 1
NEUROLOGICAL NEGATIVE: 1
ALLERGIC/IMMUNOLOGIC NEGATIVE: 1

## 2024-05-08 ENCOUNTER — TELEPHONE (OUTPATIENT)
Dept: DERMATOLOGY | Facility: CLINIC | Age: 49
End: 2024-05-08
Payer: COMMERCIAL

## 2024-05-08 NOTE — TELEPHONE ENCOUNTER
"Pt called office stated her full name and said the solution she was given is making her hair dry and fall out. I pulled her up in Epic. I asked her what solution? She says \"don't you want my name?\" I said you already gave me you full name.  She says \"then don't you have my chart pulled up, to see what solution is?\" I then ask her if it was the Clobetasol solution?, she says \"yea\". I instructed pt to stop using Clobetasol(per pt last application was 2 days ago). I asked pt if she had a fuv scheduled with Dr. De La Garza, because with the reported side effects Dr. De La Garza will want to see her. She states \"don't you have my chart pulled up\" , I said yes but I am not looking at if you have an appt scheduled or not, I do however know that you no showed your last scheduled appt. Pt stated she would schedule a virtual visit. I said Dr. De La Garza does not see hair loss pts virtually, it is not an appropriate way to evaluate. I asked pt what office she wanted to be scheduled in so I could provide her with the number. Pt said well where are you at? I said [facility]. She stated where was my last visit at? I provided her with [facility] number to call and schedule.   "

## 2024-05-16 ENCOUNTER — TELEPHONE (OUTPATIENT)
Dept: DERMATOLOGY | Facility: CLINIC | Age: 49
End: 2024-05-16
Payer: COMMERCIAL

## 2024-05-16 NOTE — TELEPHONE ENCOUNTER
Spoke to Pt and advised that provider wanted her to discontinue use of Clobetasol Solution. Also advised Pt that with scarring Alopecia it is unlikely to get regrowth and that Ozempic can cause rapid shedding per RP.  Pt expresses understanding. Pt inquiring if steroid injections would be helpful with her hair loss but states she will discuss at next appt.

## 2024-05-17 DIAGNOSIS — E66.9 OBESITY (BMI 30-39.9): ICD-10-CM

## 2024-05-17 DIAGNOSIS — R73.03 PREDIABETES: ICD-10-CM

## 2024-05-17 RX ORDER — SEMAGLUTIDE 0.68 MG/ML
0.5 INJECTION, SOLUTION SUBCUTANEOUS
Qty: 3 ML | Refills: 1 | Status: SHIPPED | OUTPATIENT
Start: 2024-05-19

## 2024-05-17 NOTE — TELEPHONE ENCOUNTER
MEDICATION WAS LEFT OUT BEFORE OPENING AND HAD TO GET RID OF IT PER PHARMACY.  NEEDS A REFILL CALLED IN.

## 2024-06-03 ENCOUNTER — APPOINTMENT (OUTPATIENT)
Dept: RADIOLOGY | Facility: CLINIC | Age: 49
End: 2024-06-03
Payer: COMMERCIAL

## 2024-06-04 ENCOUNTER — HOSPITAL ENCOUNTER (OUTPATIENT)
Dept: RADIOLOGY | Facility: HOSPITAL | Age: 49
Discharge: HOME | End: 2024-06-04
Payer: COMMERCIAL

## 2024-06-04 DIAGNOSIS — E04.1 THYROID NODULE: ICD-10-CM

## 2024-06-04 PROCEDURE — 76536 US EXAM OF HEAD AND NECK: CPT

## 2024-06-04 PROCEDURE — 76536 US EXAM OF HEAD AND NECK: CPT | Performed by: STUDENT IN AN ORGANIZED HEALTH CARE EDUCATION/TRAINING PROGRAM

## 2024-06-05 DIAGNOSIS — L66.9 CICATRICIAL ALOPECIA: ICD-10-CM

## 2024-06-05 RX ORDER — CLOBETASOL PROPIONATE 0.46 MG/ML
SOLUTION TOPICAL
Qty: 50 ML | Refills: 1 | Status: SHIPPED | OUTPATIENT
Start: 2024-06-05

## 2024-06-06 DIAGNOSIS — M25.561 ACUTE PAIN OF RIGHT KNEE: Primary | ICD-10-CM

## 2024-06-06 RX ORDER — ACETAMINOPHEN 325 MG/1
650 TABLET ORAL EVERY 8 HOURS PRN
Qty: 40 TABLET | Refills: 0 | Status: SHIPPED | OUTPATIENT
Start: 2024-06-06 | End: 2024-06-13

## 2024-06-24 ENCOUNTER — HOSPITAL ENCOUNTER (OUTPATIENT)
Dept: RADIOLOGY | Facility: HOSPITAL | Age: 49
End: 2024-06-24
Payer: COMMERCIAL

## 2024-07-09 DIAGNOSIS — R73.03 PREDIABETES: ICD-10-CM

## 2024-07-09 DIAGNOSIS — E66.9 OBESITY (BMI 30-39.9): ICD-10-CM

## 2024-07-12 RX ORDER — SEMAGLUTIDE 0.68 MG/ML
0.5 INJECTION, SOLUTION SUBCUTANEOUS
Qty: 3 ML | Refills: 1 | Status: SHIPPED | OUTPATIENT
Start: 2024-07-14

## 2024-07-26 ENCOUNTER — APPOINTMENT (OUTPATIENT)
Dept: DERMATOLOGY | Facility: CLINIC | Age: 49
End: 2024-07-26
Payer: COMMERCIAL

## 2024-07-26 DIAGNOSIS — L70.0 ACNE VULGARIS: ICD-10-CM

## 2024-07-26 DIAGNOSIS — L66.9 CICATRICIAL ALOPECIA: Primary | ICD-10-CM

## 2024-07-26 PROCEDURE — 99214 OFFICE O/P EST MOD 30 MIN: CPT | Performed by: DERMATOLOGY

## 2024-07-26 RX ORDER — MINOXIDIL 5 %
SOLUTION, NON-ORAL TOPICAL
Qty: 60 ML | Refills: 11 | Status: SHIPPED | OUTPATIENT
Start: 2024-07-26

## 2024-07-26 RX ORDER — CLINDAMYCIN PHOSPHATE 10 MG/G
GEL TOPICAL
Qty: 60 G | Refills: 3 | Status: SHIPPED | OUTPATIENT
Start: 2024-07-26

## 2024-07-26 RX ORDER — CLOBETASOL PROPIONATE 0.5 MG/ML
SOLUTION TOPICAL
Qty: 50 ML | Refills: 1 | Status: SHIPPED | OUTPATIENT
Start: 2024-07-26

## 2024-07-26 RX ORDER — BENZOYL PEROXIDE 50 MG/ML
LIQUID TOPICAL
Qty: 227 G | Refills: 3 | Status: SHIPPED | OUTPATIENT
Start: 2024-07-26

## 2024-07-26 ASSESSMENT — DERMATOLOGY PATIENT ASSESSMENT
ARE YOU ON BIRTH CONTROL: NO
DO YOU HAVE ANY NEW OR CHANGING LESIONS: NO
DO YOU HAVE IRREGULAR MENSTRUAL CYCLES: NO
DO YOU USE A TANNING BED: NO
ARE YOU TRYING TO GET PREGNANT: NO
ARE YOU AN ORGAN TRANSPLANT RECIPIENT: NO
HAVE YOU HAD OR DO YOU HAVE VASCULAR DISEASE: NO
FOR PATIENTS COMING IN FOR A FOLLOW-UP VISIT - HAVE THERE BEEN ANY CHANGES IN YOUR HEALTH SINCE YOUR LAST VISIT: NO
HAVE YOU HAD OR DO YOU HAVE A STAPH INFECTION: NO

## 2024-07-26 ASSESSMENT — DERMATOLOGY QUALITY OF LIFE (QOL) ASSESSMENT
DATE THE QUALITY-OF-LIFE ASSESSMENT WAS COMPLETED: 67047
WHAT SINGLE SKIN CONDITION LISTED BELOW IS THE PATIENT ANSWERING THE QUALITY-OF-LIFE ASSESSMENT QUESTIONS ABOUT: ALOPECIA
RATE HOW BOTHERED YOU ARE BY SYMPTOMS OF YOUR SKIN PROBLEM (EG, ITCHING, STINGING BURNING, HURTING OR SKIN IRRITATION): 6 - ALWAYS BOTHERED
RATE HOW EMOTIONALLY BOTHERED YOU ARE BY YOUR SKIN PROBLEM (FOR EXAMPLE, WORRY, EMBARRASSMENT, FRUSTRATION): 6 - ALWAYS BOTHERED
ARE THERE EXCLUSIONS OR EXCEPTIONS FOR THE QUALITY OF LIFE ASSESSMENT: NO
RATE HOW BOTHERED YOU ARE BY EFFECTS OF YOUR SKIN PROBLEMS ON YOUR ACTIVITIES (EG, GOING OUT, ACCOMPLISHING WHAT YOU WANT, WORK ACTIVITIES OR YOUR RELATIONSHIPS WITH OTHERS): 6 - ALWAYS BOTHERED

## 2024-07-26 ASSESSMENT — ITCH NUMERIC RATING SCALE: HOW SEVERE IS YOUR ITCHING?: 0

## 2024-07-26 ASSESSMENT — PATIENT GLOBAL ASSESSMENT (PGA): PATIENT GLOBAL ASSESSMENT: PATIENT GLOBAL ASSESSMENT:  4 - SEVERE

## 2024-07-26 NOTE — PROGRESS NOTES
Subjective     Marquita To is a 49 y.o. female who presents for the following: Alopecia (Pt here to follow up for Cicatricial alopecia located on Mid Frontal Scalp. Pt currently using Clobetasol Solution and OTC Topical Minoxidil 5% Solution. LV - prescription for cranial prosthesis from [facility] given. Pt reports less shedding. /) and Acne (Pt here to follow up for Acne located on Face, Chest, and Back. Pt currently using BPO, Clindamycin Gel, and Tretinoin 0.025%. Pt reports improvement. ).     Review of Systems:  No other skin or systemic complaints other than what is documented elsewhere in the note.    The following portions of the chart were reviewed this encounter and updated as appropriate:          Skin Cancer History  No skin cancer on file.      Specialty Problems          Dermatology Problems    Acne        Objective   Well appearing patient in no apparent distress; mood and affect are within normal limits.    A focused skin examination was performed of the back, face, scalp. All findings within normal limits unless otherwise noted below.    Assessment/Plan   1. Cicatricial alopecia  Mid Frontal Scalp  Loss of follicular ostia with some hair re-growth in the frontotemporal area    Central Centrifugal Cicatricial Alopecia (CCCA) is a form of scarring alopecia on the scalp that results in permanent hair loss. It is the most common form of scarring hair loss seen in black women. However, it may be seen in men and among persons of all races and hair colour (though rarely). Middle-aged women are most commonly affected.     The exact cause of CCCA is unknown and is likely multifactorial. A genetic component has been suggested.     Treatment is to preserve residual hair and prevent progression.     Continue topical clobetasol 0.05% scalp solution 2x daily 2 weeks on 2 weeks off. Side effects of toical steroids reviewed including skin atrophy, dyspigmentation.    Recommend patient start OTC minoxidil 5%  solution to help thicken the hair, she request rx sent which was done tody     We will re-give prescription for the 3rd time for cranial prosthesis from [facility] - Printed rx from 1/2024 - she is aware she will first have to purchase and then submit for reimbursement to insurance.    minoxidiL 5 % solution - Mid Frontal Scalp  Apply topically once daily    Related Medications  clobetasol (Temovate) 0.05 % external solution  Apply to scalp 2 times daily for 2 weeks on 2 weeks off    2. Acne vulgaris  Left Anterior Mandible, Left Buccal Cheek, Left Chin, Mid Back, Mid Chin, Right Anterior Mandible, Right Buccal Cheek, Right Chin  Hyperpigmented papules and macules, 1 inflammatory papule on mid lower back  Atrophic macules on bilateral cheeks    The chronic and intermittently flaring nature of acne was reviewed with the patient today. Patient advised that acne is multifactorial. Treatment options were reviewed with the patient. Advised that typically takes 2-3 months to see 60-80% improvement and treatments may worsen acne appearance prior to improvement.     Wash face twice daily  Do not spot treat, treat the entire surface area to treat current breakouts and prevent new breakouts    Treatment recommendations:  -noncompliant with oral medications, not a candidate for long term oral therapy  - continue clindamycin 1%gel 2x elena to face, back  -continue benzoyl peroxide 5% cleanser daily    Patient advised benzoyl peroxide may bleach clothing or towels and can be drying and irritating to the skin.      Related Medications  clindamycin (Cleocin T) 1 % gel  Apply to face daily in morning    benzoyl peroxide 5 % external wash  Lather on face,chest, back daily in shower then rinse    Follow up in 1 year

## 2024-08-12 ENCOUNTER — TELEPHONE (OUTPATIENT)
Dept: PRIMARY CARE | Facility: CLINIC | Age: 49
End: 2024-08-12
Payer: COMMERCIAL

## 2024-08-13 ENCOUNTER — TELEPHONE (OUTPATIENT)
Dept: DERMATOLOGY | Facility: CLINIC | Age: 49
End: 2024-08-13
Payer: COMMERCIAL

## 2024-08-13 DIAGNOSIS — R73.03 PREDIABETES: ICD-10-CM

## 2024-08-13 DIAGNOSIS — E66.9 OBESITY (BMI 30-39.9): ICD-10-CM

## 2024-08-13 RX ORDER — SEMAGLUTIDE 0.68 MG/ML
0.25 INJECTION, SOLUTION SUBCUTANEOUS
Qty: 3 ML | Refills: 1 | Status: SHIPPED | OUTPATIENT
Start: 2024-08-13

## 2024-08-13 NOTE — TELEPHONE ENCOUNTER
Call placed to [facility] after receiving request from Pt to fill out form Pt faxed to office. Spoke to representative Royal NOGUERA and was informed that on their end RP was not responsible to fill out form d/t the nature of the prescription. Representative informed that Pt needed to fill out form and send copy of receipt and prescription.  Msg sent via [employer] to Pt to inform of above information. Also provided Pt with contact number to [facility] for any further questions.

## 2024-09-04 ENCOUNTER — APPOINTMENT (OUTPATIENT)
Dept: PRIMARY CARE | Facility: CLINIC | Age: 49
End: 2024-09-04
Payer: COMMERCIAL

## 2024-09-05 ENCOUNTER — OFFICE VISIT (OUTPATIENT)
Dept: PRIMARY CARE | Facility: CLINIC | Age: 49
End: 2024-09-05
Payer: COMMERCIAL

## 2024-09-05 VITALS
HEART RATE: 75 BPM | OXYGEN SATURATION: 98 % | SYSTOLIC BLOOD PRESSURE: 129 MMHG | TEMPERATURE: 97.9 F | WEIGHT: 176 LBS | HEIGHT: 63 IN | DIASTOLIC BLOOD PRESSURE: 86 MMHG | BODY MASS INDEX: 31.18 KG/M2

## 2024-09-05 DIAGNOSIS — R22.1 NECK SWELLING: Primary | ICD-10-CM

## 2024-09-05 DIAGNOSIS — R07.89 OTHER CHEST PAIN: ICD-10-CM

## 2024-09-05 ASSESSMENT — PATIENT HEALTH QUESTIONNAIRE - PHQ9
SUM OF ALL RESPONSES TO PHQ9 QUESTIONS 1 AND 2: 0
2. FEELING DOWN, DEPRESSED OR HOPELESS: NOT AT ALL
1. LITTLE INTEREST OR PLEASURE IN DOING THINGS: NOT AT ALL

## 2024-09-05 ASSESSMENT — ENCOUNTER SYMPTOMS
OCCASIONAL FEELINGS OF UNSTEADINESS: 0
LOSS OF SENSATION IN FEET: 0
DEPRESSION: 0

## 2024-09-05 ASSESSMENT — COLUMBIA-SUICIDE SEVERITY RATING SCALE - C-SSRS
1. IN THE PAST MONTH, HAVE YOU WISHED YOU WERE DEAD OR WISHED YOU COULD GO TO SLEEP AND NOT WAKE UP?: NO
6. HAVE YOU EVER DONE ANYTHING, STARTED TO DO ANYTHING, OR PREPARED TO DO ANYTHING TO END YOUR LIFE?: NO
2. HAVE YOU ACTUALLY HAD ANY THOUGHTS OF KILLING YOURSELF?: NO

## 2024-09-05 ASSESSMENT — PAIN SCALES - GENERAL: PAINLEVEL: 8

## 2024-09-05 NOTE — PROGRESS NOTES
"Patient presents to clinic as problem visit Subjective   Patient ID: Marquita To is a 49 y.o. female who presents for swollen thyroid (Patient is having a hard time swallowing and feels extremely sluggish. Has been going on for 2-3 days. Patient is also experiencing chest pain. ).    HPI/patient presents to clinic as problem visit because she is complaining of swelling of the neck, hoarseness, neck pain and difficulty swallowing for the past 3 days.  Her symptoms are also associated with swollen glands, sharp chest pain and bulging of her eyes.  She denies any fever, sore throat, earache, headache, shortness of breath and facial pain.  EKG done shows sinus rhythm at 71 bpm with normal axis normal interval nonspecific T wave changes.      Review of Systems   Constitutional: Negative.    HENT: Negative.          Complaining of hoarseness, neck pain with swelling and difficulty in swallowing   Eyes: Negative.    Respiratory: Negative.     Cardiovascular: Negative.    Gastrointestinal: Negative.    Endocrine: Negative.    Genitourinary: Negative.    Musculoskeletal: Negative.    Skin: Negative.    Allergic/Immunologic: Negative.    Neurological: Negative.    Hematological: Negative.    Psychiatric/Behavioral: Negative.         Objective   /86   Pulse 75   Temp 36.6 °C (97.9 °F) (Oral)   Ht 1.6 m (5' 3\")   Wt 79.8 kg (176 lb)   SpO2 98%   BMI 31.18 kg/m²     Physical Exam  Constitutional:       Appearance: Normal appearance. She is obese.   HENT:      Right Ear: Tympanic membrane normal.      Left Ear: Tympanic membrane and ear canal normal.      Nose: Nose normal.   Neck:      Vascular: No carotid bruit.      Comments: Neck examination notable for diffusely enlarged thyroid with submandibular lymphadenopathy  Cardiovascular:      Rate and Rhythm: Normal rate.   Pulmonary:      Effort: No respiratory distress.      Breath sounds: No stridor. No wheezing.   Abdominal:      Palpations: Abdomen is soft.     "  Tenderness: There is no abdominal tenderness. There is no guarding or rebound.   Musculoskeletal:      Cervical back: Neck supple.   Skin:     Coloration: Skin is not jaundiced.      Findings: No bruising.   Neurological:      General: No focal deficit present.      Mental Status: She is alert and oriented to person, place, and time.   Psychiatric:         Mood and Affect: Mood normal.         Assessment/Plan   Patient is advised to go to emergency for further  evaluation regarding enlargement of her neck and thyroid gland pain to rule out subacute thyroiditis..

## 2024-09-07 ASSESSMENT — ENCOUNTER SYMPTOMS
RESPIRATORY NEGATIVE: 1
NEUROLOGICAL NEGATIVE: 1
CARDIOVASCULAR NEGATIVE: 1
MUSCULOSKELETAL NEGATIVE: 1
CONSTITUTIONAL NEGATIVE: 1
EYES NEGATIVE: 1
PSYCHIATRIC NEGATIVE: 1
ENDOCRINE NEGATIVE: 1
ALLERGIC/IMMUNOLOGIC NEGATIVE: 1
HEMATOLOGIC/LYMPHATIC NEGATIVE: 1
GASTROINTESTINAL NEGATIVE: 1

## 2024-09-13 ENCOUNTER — APPOINTMENT (OUTPATIENT)
Dept: DERMATOLOGY | Facility: CLINIC | Age: 49
End: 2024-09-13
Payer: COMMERCIAL

## 2024-10-22 DIAGNOSIS — R73.03 PREDIABETES: ICD-10-CM

## 2024-10-22 DIAGNOSIS — E66.9 OBESITY (BMI 30-39.9): ICD-10-CM

## 2024-10-22 RX ORDER — SEMAGLUTIDE 0.68 MG/ML
0.25 INJECTION, SOLUTION SUBCUTANEOUS
Qty: 3 ML | Refills: 1 | Status: SHIPPED | OUTPATIENT
Start: 2024-10-22

## 2024-10-24 ENCOUNTER — APPOINTMENT (OUTPATIENT)
Dept: ENDOCRINOLOGY | Facility: CLINIC | Age: 49
End: 2024-10-24
Payer: COMMERCIAL

## 2024-11-05 ENCOUNTER — APPOINTMENT (OUTPATIENT)
Dept: OPHTHALMOLOGY | Facility: CLINIC | Age: 49
End: 2024-11-05
Payer: COMMERCIAL

## 2024-11-22 ENCOUNTER — OFFICE VISIT (OUTPATIENT)
Dept: OTOLARYNGOLOGY | Facility: HOSPITAL | Age: 49
End: 2024-11-22
Payer: COMMERCIAL

## 2024-11-22 VITALS — WEIGHT: 191.6 LBS | TEMPERATURE: 98.7 F | HEIGHT: 63 IN | BODY MASS INDEX: 33.95 KG/M2

## 2024-11-22 DIAGNOSIS — E03.8 SUBCLINICAL HYPOTHYROIDISM: ICD-10-CM

## 2024-11-22 DIAGNOSIS — R49.0 DYSPHONIA: ICD-10-CM

## 2024-11-22 DIAGNOSIS — E04.1 THYROID NODULE: Primary | ICD-10-CM

## 2024-11-22 DIAGNOSIS — K21.9 GASTROESOPHAGEAL REFLUX DISEASE WITHOUT ESOPHAGITIS: ICD-10-CM

## 2024-11-22 DIAGNOSIS — G47.30 SLEEP APNEA, UNSPECIFIED TYPE: ICD-10-CM

## 2024-11-22 PROCEDURE — 99214 OFFICE O/P EST MOD 30 MIN: CPT | Performed by: STUDENT IN AN ORGANIZED HEALTH CARE EDUCATION/TRAINING PROGRAM

## 2024-11-22 PROCEDURE — 31575 DIAGNOSTIC LARYNGOSCOPY: CPT | Performed by: STUDENT IN AN ORGANIZED HEALTH CARE EDUCATION/TRAINING PROGRAM

## 2024-11-22 RX ORDER — MESALAMINE 800 MG/1
1 TABLET, DELAYED RELEASE ORAL
COMMUNITY

## 2024-11-22 RX ORDER — METRONIDAZOLE 500 MG/1
TABLET ORAL
COMMUNITY
Start: 2024-11-21

## 2024-11-22 RX ORDER — FLUCONAZOLE 150 MG/1
TABLET ORAL
COMMUNITY
Start: 2024-11-21

## 2024-11-22 RX ORDER — METHYLPREDNISOLONE 4 MG/1
TABLET ORAL
COMMUNITY
Start: 2024-09-06

## 2024-11-22 RX ORDER — OMEPRAZOLE 20 MG/1
20 CAPSULE, DELAYED RELEASE ORAL DAILY
Qty: 30 CAPSULE | Refills: 11 | Status: SHIPPED | OUTPATIENT
Start: 2024-11-22 | End: 2025-11-22

## 2024-11-22 ASSESSMENT — PATIENT HEALTH QUESTIONNAIRE - PHQ9
SUM OF ALL RESPONSES TO PHQ9 QUESTIONS 1 & 2: 1
10. IF YOU CHECKED OFF ANY PROBLEMS, HOW DIFFICULT HAVE THESE PROBLEMS MADE IT FOR YOU TO DO YOUR WORK, TAKE CARE OF THINGS AT HOME, OR GET ALONG WITH OTHER PEOPLE: NOT DIFFICULT AT ALL
1. LITTLE INTEREST OR PLEASURE IN DOING THINGS: NOT AT ALL
2. FEELING DOWN, DEPRESSED OR HOPELESS: SEVERAL DAYS

## 2024-11-22 NOTE — PROGRESS NOTES
"HEAD AND NECK SURGERY CONSULT  [facility]    HPI  I had the pleasure of seeing Marquita To as a consultation today for evaluation of hoarseness.     She has had a hoarse voice for a long time. She is not sure what makes it better or worse. She has ear achiness like an 'ear infection', no drainage, no fullness. No prior surgery on her ear. Reports her glands and thyroid in her neck swells from time to time, this is not painful. When it swells she has more difficulty swallowing. Minimal swallow trouble today, swelling has gone down. She has known thyroid nodules, recent ultrasound showed stability. Latest TSH was normal. Has a sensation of something stuck in her throat. Reports severe acid reflux symptoms, has not trialed a PPI recently.    She reports difficulty with sleep. Snores nightly, has been told she has pauses and gasping in her sleeping. Reports prior recommendation by PCP to pursue a sleep study, she has not been able to get this done.     Denies personal history of radiation to the neck, family history of thyroid disease or cancer.     The patient denies having prior trauma or surgery to their head and neck. There is not a personal or family history of blood clots, easy bleeding or bruising, or anesthesia concerns.     Tobacco use: The patient  reports that she has been smoking cigars. She has never used smokeless tobacco.   Alcohol Use: The patient  reports current alcohol use.     Physical Examination  Vitals:  Temp 37.1 °C (98.7 °F) (Tympanic)   Ht 1.6 m (5' 3\")   Wt 86.9 kg (191 lb 9.6 oz)   BMI 33.94 kg/m²   General: Examination reveals a well-developed, well-nourished patient in no apparent distress.  The patient has no audible dysphonia, stridor or airway distress.  The patient is oriented, alert and responsive.    Ears: bilateral EAC patent, TM intact without erythema or fluid  Oral Cavity:  The patient is able to open the mouth widely without trismus.  The floor of mouth and " oral tongue are soft, and no mucosal abnormalities are noted on the lips or within the oral cavity.  The oral tongue is fully mobile and midline on protrusion.    Oropharynx: There are no mucosal abnormalities noted within the oropharynx.  The soft palate elevates symmetrically, the tonsils and base of tongue are normal to inspection and palpation.       Salivary glands: There are no palpable masses of the parotid or submandibular glands.   Neuro: Cranial nerves 2-12 are without obvious abnormality.  Neck: The neck is soft and symmetric.  There is no palpable adenopathy.   Thyroid: non tender, minimal fullness     Procedure Note:  Diagnostic Flexible Laryngoscopy (32136)  Indication: patient symptoms requiring evaluation of pharyngeal/laryngeal/hypopharyngeal structures  Surgeon: Gardenia Raymond MD  Informed Consent: The procedure, risks, and benefits were discussed with the patient and verbal consent obtained to proceed.   Procedure: Topical anesthesia (lidocaine) was used to spray the nasal mucosa.  A flexible laryngoscopy was inserted through the nasal cavity. The scope was then passed through the nasopharynx, oropharynx, and supraglottis. The vallecula, hypopharynx, supraglottis, glottis and subglottis were then visualized. The scope was then removed. The patient tolerated the procedure with no complications.     Findings: All of the visualized areas noted above were noted to be without evidence of lesions, ulcerations or masses.  The vocal folds adduct and abduct fully. Mild post cricoid erythema    Attestation: I performed the procedure in its entirety, or was present with the resident/fellow for the entirety of the procedure.      DATA REVIEWED:  Labs:  Lab Results   Component Value Date    WBC 6.0 09/21/2023    HGB 12.7 09/21/2023    HCT 36.6 09/21/2023     09/21/2023    NEUTROABS 4.99 03/10/2022     Lab Results   Component Value Date    TSH 1.95 10/03/2023    HGBA1C 5.8 (H) 12/05/2023         Radiology: I personally reviewed the Thyroid US from 6/4/2024 which demonstrated stable nodules, right TR3 largest dimension 0.7 cm, TR1 largest dimension 2.2 cm. I also reviewed the CT scan from 9/6/2024 demonstrating mild bilateral hypertrophy of tonsils, redemonstrated thyroid nodules.     Review of prior medical records: I reviewed the patient's medical records which included a clinic note from Dr. Jeff Avilez dated 9/5/2024 in which he detailed thyroid concerns and referrals placed.     Pathology: I reviewed the pathology report from the biopsy 11/16/2023 of the left thyroid nodule which demonstrated benign thyroid nodule.     ASSESSMENT and PLAN:    Thyroid Nodule(s)  - Reviewed work up and treatment options, as well as exam findings   - Per guidelines, nodules are stable and do not require further follow up or biopsy  - Her thyroid hormone is also normal. Discussed possible enopthalmos, she is seeing ophthalmology soon for this. Possible subacute thyroiditis, may benefit from endocrinology referral to discuss    2. Sleep apnea  3. Snoring  - discussed symptoms, may be component of sleep apnea. This can also contribute to dry mouth and throat irritation. Referral placed to sleep medicine today.     4. Laryngopharyngeal Reflux  - PPI ordered today, discussed appropriate way to take the medication. Reviewed common food triggers that can worsen reflux symptoms      5. Dysphonia  - discussed it is less likely to be related to her thyroid nodules. Vocal cords are moving normally on flex scope exam today without any lesions  - reviewed vocal hygiene including hydration. She would like to hold off on referral to voice specialist at this time    Gardenia Raymond MD

## 2024-11-24 NOTE — PROGRESS NOTES
Subjective   Patient ID: Marquita To is a 49 y.o. female.        No current outpatient medications on file. (Ophthalmology pharm classes)       Current Outpatient Medications (Other)   Medication Sig Dispense Refill    benzoyl peroxide 5 % external wash Lather on face,chest, back daily in shower then rinse 227 g 3    Cetaphil (Cetaphil Moisturizing) cream Apply to body daily after bathing and as needed for dry skin 453 g 11    clindamycin (Cleocin T) 1 % gel Apply to face daily in morning 60 g 3    clobetasol (Temovate) 0.05 % external solution Apply to scalp 2 times daily for 2 weeks on 2 weeks off 50 mL 1    cyclobenzaprine (Flexeril) 10 mg tablet Take 1 tablet (10 mg) by mouth 2 times a day for 14 days. 28 tablet 0    fluconazole (Diflucan) 150 mg tablet       mesalamine (Asacol) 800 mg EC tablet Take 1 tablet (800 mg) by mouth once daily.      methylPREDNISolone (Medrol Dospak) 4 mg tablets       metroNIDAZOLE (Flagyl) 500 mg tablet       minoxidiL 5 % solution Apply topically once daily 60 mL 11    multivitamin with iron (TAB-A-AMY/IRON ORAL)       naproxen (Naprosyn) 500 mg tablet Take 1 tablet (500 mg) by mouth if needed.      omeprazole (PriLOSEC) 20 mg DR capsule Take 1 capsule (20 mg) by mouth once daily. Do not crush or chew. 30 capsule 11    Ozempic 0.25 mg or 0.5 mg (2 mg/3 mL) pen injector Inject 0.25 mg under the skin every 7 days. 3 mL 1    PARoxetine (Paxil) 40 mg tablet Take 1 tablet (40 mg) by mouth once daily in the morning. 240 tablet 3    pravastatin (Pravachol) 20 mg tablet Take 1 tablet (20 mg) by mouth once daily at bedtime. 90 tablet 1    QUEtiapine (SEROquel) 50 mg tablet Take 1 tablet (50 mg) by mouth once daily at bedtime. 90 tablet 3    simethicone (Mylicon) 80 mg chewable tablet Chew 1 tablet (80 mg) every 6 hours if needed for flatulence. 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

## 2024-11-25 ENCOUNTER — APPOINTMENT (OUTPATIENT)
Dept: OPHTHALMOLOGY | Facility: CLINIC | Age: 49
End: 2024-11-25
Payer: COMMERCIAL

## 2024-11-25 DIAGNOSIS — R73.03 PREDIABETES: ICD-10-CM

## 2024-11-25 DIAGNOSIS — M54.2 NECK PAIN: ICD-10-CM

## 2024-11-25 DIAGNOSIS — E11.3499: Primary | ICD-10-CM

## 2024-11-25 DIAGNOSIS — R25.2 CRAMP IN MUSCLE: Primary | ICD-10-CM

## 2024-11-25 DIAGNOSIS — H53.483 GENERALIZED CONTRACTION OF VISUAL FIELD, BILATERAL: ICD-10-CM

## 2024-11-25 PROCEDURE — 92134 CPTRZ OPH DX IMG PST SGM RTA: CPT | Performed by: OPHTHALMOLOGY

## 2024-11-25 PROCEDURE — 99203 OFFICE O/P NEW LOW 30 MIN: CPT | Performed by: OPHTHALMOLOGY

## 2024-11-25 RX ORDER — CYCLOBENZAPRINE HCL 10 MG
10 TABLET ORAL 2 TIMES DAILY
Qty: 28 TABLET | Refills: 0 | Status: SHIPPED | OUTPATIENT
Start: 2024-11-25 | End: 2024-12-09

## 2024-11-25 ASSESSMENT — ENCOUNTER SYMPTOMS
EYES NEGATIVE: 1
GASTROINTESTINAL NEGATIVE: 0
HEMATOLOGIC/LYMPHATIC NEGATIVE: 0
CONSTITUTIONAL NEGATIVE: 0
PSYCHIATRIC NEGATIVE: 0
CARDIOVASCULAR NEGATIVE: 0
MUSCULOSKELETAL NEGATIVE: 0
RESPIRATORY NEGATIVE: 0
ALLERGIC/IMMUNOLOGIC NEGATIVE: 0
NEUROLOGICAL NEGATIVE: 0
ENDOCRINE NEGATIVE: 0

## 2024-11-25 ASSESSMENT — VISUAL ACUITY
OS_SC: 20/40
OD_SC: 20/30
OD_SC+: -1
METHOD: SNELLEN - LINEAR

## 2024-11-25 ASSESSMENT — TONOMETRY
OD_IOP_MMHG: 14
IOP_METHOD: TONOPEN
OS_IOP_MMHG: 15

## 2024-11-30 ASSESSMENT — EXTERNAL EXAM - LEFT EYE: OS_EXAM: NORMAL

## 2024-11-30 ASSESSMENT — EXTERNAL EXAM - RIGHT EYE: OD_EXAM: NORMAL

## 2024-12-06 ENCOUNTER — APPOINTMENT (OUTPATIENT)
Dept: ENDOCRINOLOGY | Facility: CLINIC | Age: 49
End: 2024-12-06
Payer: COMMERCIAL

## 2024-12-06 DIAGNOSIS — E03.8 SUBCLINICAL HYPOTHYROIDISM: ICD-10-CM

## 2024-12-06 DIAGNOSIS — E04.9 GOITER: Primary | ICD-10-CM

## 2024-12-06 DIAGNOSIS — E04.1 THYROID NODULE: ICD-10-CM

## 2024-12-06 PROCEDURE — 99204 OFFICE O/P NEW MOD 45 MIN: CPT | Performed by: STUDENT IN AN ORGANIZED HEALTH CARE EDUCATION/TRAINING PROGRAM

## 2024-12-06 NOTE — PROGRESS NOTES
Virtual or Telephone Consent    A telephone visit (audio only) between the patient (at the originating site) and the provider (at the distant site) was utilized to provide this telehealth service.   Verbal consent was requested and obtained from Marquita To on this date, 12/09/24 for a telehealth visit.       49 F PMH: reflux     For thyroid evaluation     Having symptoms of fatigue, hair thinning,     Thyroid ultrasound was ordered by PCP due to physical exam findings of a nodular gland   Had an ultrasound showing dominant left inferior nodule 2cm with calcification  Had an FNA in 11/2023 of the left sided nodule, initially FLUS but was repeated and was benign     Had a repeat ultrasound in 6/2024 showing stable nodule     She saw ophthalmology who referred her to oculoplastics     Famx-no thyroid do, thyroid cancer    Having some throat discomfort  Eye irritation  ROS reviewed and is negative except for pertinent findings noted on HPI    labs and imaging reviewed, pertinent findings listed on HPI and Impression        Problem List Items Addressed This Visit       Goiter - Primary    Relevant Orders    Thyroid Stimulating Hormone    Thyroxine, Free    Triiodothyronine, Total    Thyroid Peroxidase (TPO) Antibody    Thyroid Stimulating Immunoglobulin    Thyroid nodule     Other Visit Diagnoses       Subclinical hypothyroidism                Since this is a telephone visit, will discuss with ophtha if their findings were consistent with thyroid eye disease    Repeat TFTs with antibodies due to suspicion of thyroid eye disease   Repeat thyroid ultrasound in one year     Follow up in 6 months

## 2024-12-09 PROBLEM — E04.1 THYROID NODULE: Status: ACTIVE | Noted: 2024-12-09

## 2024-12-09 PROBLEM — E04.9 GOITER: Status: ACTIVE | Noted: 2024-12-09

## 2024-12-13 DIAGNOSIS — R73.03 PREDIABETES: ICD-10-CM

## 2024-12-13 DIAGNOSIS — E66.9 OBESITY (BMI 30-39.9): ICD-10-CM

## 2024-12-13 RX ORDER — SEMAGLUTIDE 0.68 MG/ML
0.25 INJECTION, SOLUTION SUBCUTANEOUS
Qty: 3 ML | Refills: 1 | Status: SHIPPED | OUTPATIENT
Start: 2024-12-13

## 2024-12-16 ENCOUNTER — TELEPHONE (OUTPATIENT)
Dept: PRIMARY CARE | Facility: CLINIC | Age: 49
End: 2024-12-16

## 2024-12-16 ENCOUNTER — APPOINTMENT (OUTPATIENT)
Dept: PRIMARY CARE | Facility: CLINIC | Age: 49
End: 2024-12-16
Payer: COMMERCIAL

## 2024-12-16 DIAGNOSIS — E66.9 OBESITY (BMI 30-39.9): ICD-10-CM

## 2024-12-16 DIAGNOSIS — R73.03 PREDIABETES: ICD-10-CM

## 2024-12-16 RX ORDER — SEMAGLUTIDE 0.68 MG/ML
0.25 INJECTION, SOLUTION SUBCUTANEOUS
Qty: 3 ML | Refills: 1 | Status: SHIPPED | OUTPATIENT
Start: 2024-12-16

## 2024-12-25 ENCOUNTER — TELEMEDICINE (OUTPATIENT)
Dept: PRIMARY CARE | Facility: CLINIC | Age: 49
End: 2024-12-25
Payer: COMMERCIAL

## 2024-12-25 DIAGNOSIS — R60.0 PERIPHERAL EDEMA: Primary | ICD-10-CM

## 2024-12-25 PROCEDURE — 99214 OFFICE O/P EST MOD 30 MIN: CPT | Performed by: NURSE PRACTITIONER

## 2024-12-25 NOTE — PROGRESS NOTES
Subjective   Patient ID: Marquita To is a 49 y.o. female who presents for  feet swelling x 2 weeks  Swelling legs and feet x 2 weeks, does not go down with elevation, or in the morning after elevated during night.  No shortness of breath  No CP or weakness, no dizziness  Indicates this has been constant for at least 2 weeks.  Has not contacted PCP/coverage.        Review of Systems   Constitutional:  Negative for appetite change, chills and fever.   HENT: Negative.     Respiratory:  Negative for cough, chest tightness and shortness of breath.    Cardiovascular:  Positive for leg swelling. Negative for chest pain and palpitations.   Neurological:  Negative for dizziness, speech difficulty, weakness, numbness and headaches.       Objective   Physical Exam  Constitutional:       General: She is not in acute distress.     Appearance: She is not toxic-appearing.   Pulmonary:      Effort: Pulmonary effort is normal.   Musculoskeletal:      Cervical back: Neck supple.      Right lower leg: Edema present.      Left lower leg: Edema present.   Neurological:      Mental Status: She is oriented to person, place, and time.   Psychiatric:         Behavior: Behavior normal.         Assessment/Plan   Diagnoses and all orders for this visit:  Peripheral edema           GENE Hough-CNP 12/25/24 6:23 PM

## 2024-12-27 ENCOUNTER — TELEPHONE (OUTPATIENT)
Dept: PRIMARY CARE | Facility: CLINIC | Age: 49
End: 2024-12-27

## 2024-12-28 PROBLEM — R60.0 PERIPHERAL EDEMA: Status: ACTIVE | Noted: 2024-12-28

## 2024-12-28 ASSESSMENT — ENCOUNTER SYMPTOMS
HEADACHES: 0
FEVER: 0
CHILLS: 0
PALPITATIONS: 0
APPETITE CHANGE: 0
NUMBNESS: 0
DIZZINESS: 0
COUGH: 0
WEAKNESS: 0
CHEST TIGHTNESS: 0
SHORTNESS OF BREATH: 0
SPEECH DIFFICULTY: 0

## 2024-12-28 NOTE — ASSESSMENT & PLAN NOTE
Reports unchanged x 2 weeks, bilateral, no associated symptoms.  DD included HF, PVD, DVT ( lesslikely) among others.    As limited exam available recommend she be seen in UC/ED for evaluation today.  Agrees

## 2024-12-30 ENCOUNTER — APPOINTMENT (OUTPATIENT)
Dept: PRIMARY CARE | Facility: CLINIC | Age: 49
End: 2024-12-30
Payer: COMMERCIAL

## 2024-12-30 DIAGNOSIS — M79.89 BILATERAL SWELLING OF FEET: ICD-10-CM

## 2024-12-30 DIAGNOSIS — R73.03 PREDIABETES: ICD-10-CM

## 2024-12-30 DIAGNOSIS — Z13.6 SCREENING FOR HEART DISEASE: ICD-10-CM

## 2024-12-30 DIAGNOSIS — R22.0 FACIAL SWELLING: Primary | ICD-10-CM

## 2024-12-30 DIAGNOSIS — F12.90 MARIJUANA USE: ICD-10-CM

## 2024-12-30 DIAGNOSIS — Z79.899 MEDICAL CANNABIS USE: ICD-10-CM

## 2024-12-30 DIAGNOSIS — E66.9 OBESITY (BMI 30-39.9): ICD-10-CM

## 2024-12-30 PROCEDURE — 99212 OFFICE O/P EST SF 10 MIN: CPT | Performed by: INTERNAL MEDICINE

## 2024-12-30 NOTE — PROGRESS NOTES
Subjective   Patient ID: Marquita To is a 49 y.o. female who presents for Foot Swelling.    HPI patient is attended by video call because she is complaining of swelling of her feet and face going on for the past few weeks.  She denies any headache, fever, rashes, chest pain, abdominal pain, nausea vomiting and diaphoresis.  She has been noncompliant and has not had any routine blood work and CT cardiac scoring done.  She denies any history of  acne vulgaris, morbid obesity, prediabetes, thyroid nodule, status post fine-needle aspiration revealed follicular nodular disease, marijuana use, dyslipidemia, GERD, TIA, status post vaginal yeast infection and depression with anxiety .      Review of Systems   Constitutional: Negative.    HENT: Negative.     Eyes: Negative.    Respiratory: Negative.     Cardiovascular: Negative.    Gastrointestinal: Negative.    Endocrine: Negative.    Genitourinary: Negative.    Musculoskeletal: Negative.    Skin: Negative.    Allergic/Immunologic: Negative.    Neurological: Negative.    Hematological: Negative.    Psychiatric/Behavioral: Negative.         Objective   There were no vitals taken for this visit.    Physical Exam video exam notable for facial swelling and feet swelling  Lung with normal breaath sounds    Assessment/Plan   Assessment & Plan  Facial swelling  Patient will be scheduled for routine blood work including cortisol level regarding facial swelling to rule out Cushing syndrome.  Orders:    Cortisol AM; Future    Cortisol, Urine, Free; Future    Comprehensive Metabolic Panel; Future    Bilateral swelling of feet  She is advised to try compression stocking regarding swelling of her feet.  Orders:    Comprehensive Metabolic Panel; Future    Compression Stockings 15-20 mmHg    Screening for heart disease  She will be rescheduled for CT cardiac scoring regarding screening for heart disease.  Orders:    CT cardiac scoring wo IV contrast; Future    Obesity (BMI  30-39.9)  She he is advised to follow Mediterranean diet, exercise and will continue Ozempic regarding prediabetes and obesity.  She is advised to return to clinic in 2 weeks for follow-up visit.    Orders:    Comprehensive Metabolic Panel; Future    Prediabetes  She will be scheduled for routine blood work and will monitor hemoglobin A1c.  Orders:    Comprehensive Metabolic Panel; Future    Urinalysis with Reflex Microscopic; Future    Hemoglobin A1c; Future    Marijuana use  Patient will be scheduled for urine drug screen in view of marijuana use.  Orders:    Opiate/Opioid/Benzo Prescription Compliance; Future    Medical cannabis use  She is advised to consider cut down on marijuana use in view of side effects  Orders:    Opiate/Opioid/Benzo Prescription Compliance; Future

## 2024-12-30 NOTE — ASSESSMENT & PLAN NOTE
She will be scheduled for routine blood work and will monitor hemoglobin A1c.  Orders:    Comprehensive Metabolic Panel; Future    Urinalysis with Reflex Microscopic; Future    Hemoglobin A1c; Future

## 2025-01-01 ASSESSMENT — ENCOUNTER SYMPTOMS
ALLERGIC/IMMUNOLOGIC NEGATIVE: 1
NEUROLOGICAL NEGATIVE: 1
GASTROINTESTINAL NEGATIVE: 1
PSYCHIATRIC NEGATIVE: 1
MUSCULOSKELETAL NEGATIVE: 1
RESPIRATORY NEGATIVE: 1
CONSTITUTIONAL NEGATIVE: 1
ENDOCRINE NEGATIVE: 1
HEMATOLOGIC/LYMPHATIC NEGATIVE: 1
EYES NEGATIVE: 1
CARDIOVASCULAR NEGATIVE: 1

## 2025-01-16 ENCOUNTER — APPOINTMENT (OUTPATIENT)
Dept: PRIMARY CARE | Facility: CLINIC | Age: 50
End: 2025-01-16
Payer: COMMERCIAL

## 2025-01-17 ENCOUNTER — APPOINTMENT (OUTPATIENT)
Dept: SLEEP MEDICINE | Facility: CLINIC | Age: 50
End: 2025-01-17
Payer: COMMERCIAL

## 2025-01-21 DIAGNOSIS — E66.9 OBESITY (BMI 30-39.9): Primary | ICD-10-CM

## 2025-01-21 DIAGNOSIS — R73.03 PREDIABETES: ICD-10-CM

## 2025-01-22 DIAGNOSIS — E66.9 OBESITY (BMI 30-39.9): ICD-10-CM

## 2025-01-22 DIAGNOSIS — R73.03 PREDIABETES: ICD-10-CM

## 2025-01-22 RX ORDER — SEMAGLUTIDE 0.68 MG/ML
0.25 INJECTION, SOLUTION SUBCUTANEOUS
Qty: 3 ML | Refills: 1 | Status: SHIPPED | OUTPATIENT
Start: 2025-01-22

## 2025-02-11 ENCOUNTER — TELEPHONE (OUTPATIENT)
Dept: DERMATOLOGY | Facility: CLINIC | Age: 50
End: 2025-02-11
Payer: COMMERCIAL

## 2025-02-11 DIAGNOSIS — L70.0 ACNE VULGARIS: ICD-10-CM

## 2025-02-11 RX ORDER — BENZOYL PEROXIDE 50 MG/ML
LIQUID TOPICAL
Qty: 227 G | Refills: 3 | Status: SHIPPED | OUTPATIENT
Start: 2025-02-11

## 2025-02-26 ENCOUNTER — APPOINTMENT (OUTPATIENT)
Dept: SLEEP MEDICINE | Facility: CLINIC | Age: 50
End: 2025-02-26
Payer: COMMERCIAL

## 2025-03-03 ENCOUNTER — APPOINTMENT (OUTPATIENT)
Dept: OPHTHALMOLOGY | Facility: CLINIC | Age: 50
End: 2025-03-03
Payer: COMMERCIAL

## 2025-03-11 ENCOUNTER — APPOINTMENT (OUTPATIENT)
Dept: PRIMARY CARE | Facility: CLINIC | Age: 50
End: 2025-03-11
Payer: COMMERCIAL

## 2025-03-12 ENCOUNTER — APPOINTMENT (OUTPATIENT)
Dept: PRIMARY CARE | Facility: CLINIC | Age: 50
End: 2025-03-12
Payer: COMMERCIAL

## 2025-03-14 DIAGNOSIS — R73.03 PREDIABETES: ICD-10-CM

## 2025-03-14 DIAGNOSIS — E66.9 OBESITY (BMI 30-39.9): ICD-10-CM

## 2025-03-14 RX ORDER — SEMAGLUTIDE 1.34 MG/ML
1 INJECTION, SOLUTION SUBCUTANEOUS
Qty: 3 ML | Refills: 1 | Status: SHIPPED | OUTPATIENT
Start: 2025-03-16

## 2025-04-01 DIAGNOSIS — E66.9 OBESITY (BMI 30-39.9): ICD-10-CM

## 2025-04-01 DIAGNOSIS — R73.03 PREDIABETES: ICD-10-CM

## 2025-04-02 RX ORDER — SEMAGLUTIDE 0.68 MG/ML
0.25 INJECTION, SOLUTION SUBCUTANEOUS
Qty: 3 ML | Refills: 1 | Status: SHIPPED | OUTPATIENT
Start: 2025-04-02

## 2025-04-09 ENCOUNTER — APPOINTMENT (OUTPATIENT)
Dept: PRIMARY CARE | Facility: CLINIC | Age: 50
End: 2025-04-09
Payer: COMMERCIAL

## 2025-04-17 ENCOUNTER — APPOINTMENT (OUTPATIENT)
Dept: PRIMARY CARE | Facility: CLINIC | Age: 50
End: 2025-04-17
Payer: COMMERCIAL

## 2025-04-22 ENCOUNTER — APPOINTMENT (OUTPATIENT)
Dept: RADIOLOGY | Facility: HOSPITAL | Age: 50
End: 2025-04-22
Payer: COMMERCIAL

## 2025-04-22 ENCOUNTER — APPOINTMENT (OUTPATIENT)
Dept: PRIMARY CARE | Facility: CLINIC | Age: 50
End: 2025-04-22
Payer: COMMERCIAL

## 2025-04-22 ENCOUNTER — HOSPITAL ENCOUNTER (EMERGENCY)
Facility: HOSPITAL | Age: 50
Discharge: HOME | End: 2025-04-22
Payer: COMMERCIAL

## 2025-04-22 VITALS
TEMPERATURE: 96.8 F | WEIGHT: 190 LBS | BODY MASS INDEX: 33.66 KG/M2 | OXYGEN SATURATION: 99 % | SYSTOLIC BLOOD PRESSURE: 124 MMHG | DIASTOLIC BLOOD PRESSURE: 85 MMHG | HEART RATE: 85 BPM | RESPIRATION RATE: 16 BRPM

## 2025-04-22 DIAGNOSIS — M25.473 ANKLE SWELLING, UNSPECIFIED LATERALITY: Primary | ICD-10-CM

## 2025-04-22 PROCEDURE — 71046 X-RAY EXAM CHEST 2 VIEWS: CPT | Performed by: RADIOLOGY

## 2025-04-22 PROCEDURE — 71046 X-RAY EXAM CHEST 2 VIEWS: CPT

## 2025-04-22 PROCEDURE — 99283 EMERGENCY DEPT VISIT LOW MDM: CPT | Mod: 25

## 2025-04-22 ASSESSMENT — LIFESTYLE VARIABLES
EVER FELT BAD OR GUILTY ABOUT YOUR DRINKING: NO
TOTAL SCORE: 0
HAVE PEOPLE ANNOYED YOU BY CRITICIZING YOUR DRINKING: NO
EVER HAD A DRINK FIRST THING IN THE MORNING TO STEADY YOUR NERVES TO GET RID OF A HANGOVER: NO
HAVE YOU EVER FELT YOU SHOULD CUT DOWN ON YOUR DRINKING: NO

## 2025-04-22 ASSESSMENT — PAIN SCALES - GENERAL: PAINLEVEL_OUTOF10: 0 - NO PAIN

## 2025-04-22 ASSESSMENT — PAIN - FUNCTIONAL ASSESSMENT: PAIN_FUNCTIONAL_ASSESSMENT: 0-10

## 2025-04-22 NOTE — ED PROVIDER NOTES
HPI   Chief Complaint   Patient presents with    Leg Swelling       HPI  This is a 49-year-old female presents with leg swelling.  This is ongoing for a few days.  States she is on her feet a lot states she ate a lot of fried chicken and she had margaritas.  She decided to get checked in because she was with another patient already here.  She has an appointment today with her primary care doctor in [place].  She states she had a twinge of chest pain 1 day only last a few minutes otherwise unremarkable no shortness of breath no cough cold fever numbness tingling weakness no headache or visual symptoms.      Patient History   Medical History[1]  Surgical History[2]  Family History[3]  Social History[4]    Physical Exam   ED Triage Vitals [04/22/25 1206]   Temperature Heart Rate Respirations BP   36 °C (96.8 °F) 85 16 124/85      Pulse Ox Temp src Heart Rate Source Patient Position   99 % -- -- --      BP Location FiO2 (%)     Right arm --       Physical Exam      Reviewed family history social history and allergies and were noncontributory to current problem.    Review of systems as noted in history of present illness  otherwise negative. All other systems were reviewed and negative.     PMHX: Chronic conditions: reviewd in EMR, relevant history noted in HPI                Surgeries, hospitalizations: reviewed in EMR , relevant history noted in HPI                Medications: reviewed in EMR, relevant history noted in HPI                Allergies: reviewed in EMR, relevant history noted in HPI      PHYSICAL EXAM:    GENERAL/ CONSTITUTIONAL: Vitals noted, no distress. Alert and oriented  x 3. Non-toxic.  No Drooling or stridor .    HEAD: Normocephalic Atraumatic    EENT: Posterior oropharynx unremarkable. No meningismus. No LAD.  No exudate present.      EYES: PERRLA EOMI     NECK: Supple. Nontender. No midline tenderness.  Full range of motion    CARDIAC: Regular, rate, rhythm. No murmurs rubs or gallops. No  JVD    PULMONARY: Lungs clear bilaterally with good aeration. No wheezes rales or rhonchi. No respiratory distress.     GI: Soft, . Nontender. No peritoneal signs. Normoactive bowel sounds. No pulsatile masses.  No guarding or rebound    EXTREMITIES/MUSCULOSKELTAL: + peripheral ankle edema. NVIT, dorsal pedis pulses +2 /4 equal. FROM in all extremities and equal.     SKIN: No rash. Intact.     NEURO: No focal neurologic deficits,     PSYCH: appropriate mood and affect    MEDICAL DECISION MAKING:    ED Course & MDM   Diagnoses as of 04/22/25 1316   Ankle swelling, unspecified laterality   I did order basic labs for patient but she decided she was going to go see her primary doctor at 2:00 and make the              No data recorded     Ed Coma Scale Score: 15 (04/22/25 1207 : Willard Walters RN)                           Medical Decision Making    Left without treatment complete  Procedure  Procedures       [1]   Past Medical History:  Diagnosis Date    Other specified anxiety disorders 12/06/2021    Depression with anxiety    Personal history of transient ischemic attack (TIA), and cerebral infarction without residual deficits 12/06/2021    History of cerebrovascular accident   [2]   Past Surgical History:  Procedure Laterality Date    MR HEAD ANGIO WO IV CONTRAST  9/24/2018    MR HEAD ANGIO WO IV CONTRAST 9/24/2018 [facility] [place] [facility]    MR NECK ANGIO WO IV CONTRAST  9/24/2018    MR NECK ANGIO WO IV CONTRAST 9/24/2018 [facility] [place] [facility]    OTHER SURGICAL HISTORY  12/06/2021    Lumpectomy    OTHER SURGICAL HISTORY  12/06/2021    Ankle surgery    US GUIDED THYROID BIOPSY  10/26/2023    US GUIDED THYROID BIOPSY 10/26/2023 Jet Barrientos MD [facility]    US GUIDED THYROID BIOPSY  11/16/2023    US GUIDED THYROID BIOPSY 11/16/2023 Joan Mcghee [place] [facility] [place]   [3]   Family History  Problem Relation Name Age of Onset    Crohn's disease Mother      Pancreatic cancer Mother      COPD Mother      Heart disease Mother       Prostate cancer Father      COPD Father      COPD Brother      Crohn's disease Other Cousin    [4]   Social History  Tobacco Use    Smoking status: Some Days     Types: Cigars    Smokeless tobacco: Never   Substance Use Topics    Alcohol use: Yes     Comment: social    Drug use: Yes     Types: Marijuana        Divya Calvo PA-C  04/22/25 7912

## 2025-04-22 NOTE — ED TRIAGE NOTES
Pt was a visitor of another patient in the ED when she noted she had mild leg swelling. Wanted to get check out. No acute chest pain or SOB. No issues with ambulation. No calf tenderness.

## 2025-05-07 DIAGNOSIS — R73.03 PREDIABETES: ICD-10-CM

## 2025-05-07 DIAGNOSIS — E66.9 OBESITY (BMI 30-39.9): ICD-10-CM

## 2025-05-07 RX ORDER — SEMAGLUTIDE 1.34 MG/ML
1 INJECTION, SOLUTION SUBCUTANEOUS
Qty: 3 ML | Refills: 1 | OUTPATIENT
Start: 2025-05-07

## 2025-05-23 DIAGNOSIS — E66.9 OBESITY (BMI 30-39.9): ICD-10-CM

## 2025-05-23 DIAGNOSIS — R73.03 PREDIABETES: ICD-10-CM

## 2025-05-23 RX ORDER — SEMAGLUTIDE 0.68 MG/ML
0.25 INJECTION, SOLUTION SUBCUTANEOUS
Qty: 3 ML | Refills: 1 | OUTPATIENT
Start: 2025-05-23

## 2025-05-29 ENCOUNTER — TELEPHONE (OUTPATIENT)
Dept: DERMATOLOGY | Facility: CLINIC | Age: 50
End: 2025-05-29
Payer: COMMERCIAL

## 2025-05-29 DIAGNOSIS — L66.9 CICATRICIAL ALOPECIA: ICD-10-CM

## 2025-05-29 DIAGNOSIS — L70.0 ACNE VULGARIS: ICD-10-CM

## 2025-05-29 RX ORDER — BENZOYL PEROXIDE 50 MG/ML
LIQUID TOPICAL
Qty: 227 G | Refills: 1 | Status: SHIPPED | OUTPATIENT
Start: 2025-05-29

## 2025-05-29 RX ORDER — CLINDAMYCIN PHOSPHATE 10 MG/G
GEL TOPICAL
Qty: 60 G | Refills: 1 | Status: SHIPPED | OUTPATIENT
Start: 2025-05-29

## 2025-05-29 RX ORDER — MINOXIDIL 5 %
SOLUTION, NON-ORAL TOPICAL
Qty: 60 ML | Refills: 2 | Status: SHIPPED | OUTPATIENT
Start: 2025-05-29

## 2025-05-29 RX ORDER — CLOBETASOL PROPIONATE 0.5 MG/ML
SOLUTION TOPICAL
Qty: 50 ML | Refills: 1 | Status: SHIPPED | OUTPATIENT
Start: 2025-05-29

## 2025-05-29 NOTE — TELEPHONE ENCOUNTER
Pt called office asking for refill of the following medications. Yearly fuv is scheduled for 7/25/25. Dx Acne-Clindamycin 1% gel, BPO 5% wash, Dx Cicatricial Alopecia-Minoxidil 5% foam, Clobetasol 0.05% solution. All medication are pended.

## 2025-06-11 ENCOUNTER — APPOINTMENT (OUTPATIENT)
Dept: SLEEP MEDICINE | Facility: HOSPITAL | Age: 50
End: 2025-06-11
Payer: COMMERCIAL

## 2025-07-21 DIAGNOSIS — L66.9 CICATRICIAL ALOPECIA: ICD-10-CM

## 2025-07-21 RX ORDER — CLOBETASOL PROPIONATE 0.5 MG/ML
SOLUTION TOPICAL
Qty: 50 ML | Refills: 1 | OUTPATIENT
Start: 2025-07-21

## 2025-07-21 NOTE — TELEPHONE ENCOUNTER
Patient has an appointment 7/25/25 and it will be determined if patient needs refill at that time.

## 2025-07-21 NOTE — TELEPHONE ENCOUNTER
Patient called and was rescheduled from 7/25 to August 26.  Patient insists on a refill until then.  Informed patient Dr. De La Garza is out of office until then and that I would get back to her tomorrow with Dr. Segovia decision.

## 2025-07-22 DIAGNOSIS — L70.0 ACNE VULGARIS: ICD-10-CM

## 2025-07-22 DIAGNOSIS — L66.9 CICATRICIAL ALOPECIA: ICD-10-CM

## 2025-07-22 RX ORDER — MINOXIDIL 5 %
SOLUTION, NON-ORAL TOPICAL
Qty: 60 ML | Refills: 0 | Status: SHIPPED | OUTPATIENT
Start: 2025-07-22

## 2025-07-22 RX ORDER — CLINDAMYCIN PHOSPHATE 10 MG/G
GEL TOPICAL
Qty: 60 G | Refills: 0 | Status: SHIPPED | OUTPATIENT
Start: 2025-07-22

## 2025-07-22 RX ORDER — CLOBETASOL PROPIONATE 0.5 MG/ML
SOLUTION TOPICAL
Qty: 50 ML | Refills: 0 | Status: SHIPPED | OUTPATIENT
Start: 2025-07-22

## 2025-07-22 RX ORDER — TRETINOIN 0.25 MG/G
CREAM TOPICAL
Qty: 20 G | Refills: 0 | Status: SHIPPED | OUTPATIENT
Start: 2025-07-22

## 2025-07-25 ENCOUNTER — APPOINTMENT (OUTPATIENT)
Dept: DERMATOLOGY | Facility: CLINIC | Age: 50
End: 2025-07-25
Payer: COMMERCIAL

## 2025-08-20 DIAGNOSIS — L66.9 CICATRICIAL ALOPECIA: ICD-10-CM

## 2025-08-20 RX ORDER — CLOBETASOL PROPIONATE 0.5 MG/ML
SOLUTION TOPICAL
Qty: 50 ML | Refills: 0 | OUTPATIENT
Start: 2025-08-20

## 2025-08-26 ENCOUNTER — APPOINTMENT (OUTPATIENT)
Dept: DERMATOLOGY | Facility: CLINIC | Age: 50
End: 2025-08-26
Payer: COMMERCIAL